# Patient Record
Sex: FEMALE | Race: BLACK OR AFRICAN AMERICAN | Employment: OTHER | URBAN - METROPOLITAN AREA
[De-identification: names, ages, dates, MRNs, and addresses within clinical notes are randomized per-mention and may not be internally consistent; named-entity substitution may affect disease eponyms.]

---

## 2019-01-09 ENCOUNTER — HOSPITAL ENCOUNTER (OUTPATIENT)
Dept: BONE DENSITY | Age: 79
Discharge: HOME | End: 2019-01-09
Payer: MEDICARE

## 2019-01-09 DIAGNOSIS — M81.0 OSTEOPOROSIS: ICD-10-CM

## 2019-01-09 PROCEDURE — 77080 DXA BONE DENSITY AXIAL: CPT

## 2021-07-21 ENCOUNTER — TRANSCRIBE ORDER (OUTPATIENT)
Dept: SCHEDULING | Age: 81
End: 2021-07-21

## 2021-07-21 DIAGNOSIS — M54.16 LUMBAR RADICULOPATHY: Primary | ICD-10-CM

## 2021-08-03 ENCOUNTER — HOSPITAL ENCOUNTER (OUTPATIENT)
Dept: GENERAL RADIOLOGY | Age: 81
Discharge: HOME | End: 2021-08-03
Attending: PAIN MEDICINE
Payer: MEDICARE

## 2021-08-03 ENCOUNTER — TRANSCRIBE ORDER (OUTPATIENT)
Dept: GENERAL RADIOLOGY | Age: 81
End: 2021-08-03

## 2021-08-03 ENCOUNTER — HOSPITAL ENCOUNTER (OUTPATIENT)
Dept: MRI IMAGING | Age: 81
Discharge: HOME | End: 2021-08-03
Attending: PAIN MEDICINE
Payer: MEDICARE

## 2021-08-03 DIAGNOSIS — M54.16 LUMBAR RADICULOPATHY: ICD-10-CM

## 2021-08-03 DIAGNOSIS — R52 PAIN: Primary | ICD-10-CM

## 2021-08-03 DIAGNOSIS — R52 PAIN: ICD-10-CM

## 2021-08-03 PROCEDURE — 72100 X-RAY EXAM L-S SPINE 2/3 VWS: CPT

## 2021-08-03 PROCEDURE — 73030 X-RAY EXAM OF SHOULDER: CPT

## 2021-08-03 PROCEDURE — 72148 MRI LUMBAR SPINE W/O DYE: CPT

## 2021-08-03 PROCEDURE — 73565 X-RAY EXAM OF KNEES: CPT

## 2021-08-03 PROCEDURE — 72050 X-RAY EXAM NECK SPINE 4/5VWS: CPT

## 2021-08-03 PROCEDURE — 73060 X-RAY EXAM OF HUMERUS: CPT

## 2021-08-31 ENCOUNTER — TRANSCRIBE ORDER (OUTPATIENT)
Dept: SCHEDULING | Age: 81
End: 2021-08-31

## 2021-08-31 DIAGNOSIS — Z12.31 VISIT FOR SCREENING MAMMOGRAM: Primary | ICD-10-CM

## 2021-09-15 ENCOUNTER — HOSPITAL ENCOUNTER (OUTPATIENT)
Dept: MAMMOGRAPHY | Age: 81
Discharge: HOME | End: 2021-09-15
Attending: FAMILY MEDICINE
Payer: MEDICARE

## 2021-09-15 DIAGNOSIS — Z12.31 VISIT FOR SCREENING MAMMOGRAM: ICD-10-CM

## 2021-09-15 PROCEDURE — 77067 SCR MAMMO BI INCL CAD: CPT

## 2022-04-19 ENCOUNTER — TELEPHONE (OUTPATIENT)
Dept: NEUROLOGY | Age: 82
End: 2022-04-19

## 2022-05-10 ENCOUNTER — HOSPITAL ENCOUNTER (EMERGENCY)
Age: 82
Discharge: HOME | End: 2022-05-10
Attending: EMERGENCY MEDICINE
Payer: COMMERCIAL

## 2022-05-10 VITALS
RESPIRATION RATE: 18 BRPM | OXYGEN SATURATION: 96 % | DIASTOLIC BLOOD PRESSURE: 94 MMHG | HEART RATE: 80 BPM | SYSTOLIC BLOOD PRESSURE: 167 MMHG | TEMPERATURE: 97.7 F

## 2022-05-10 DIAGNOSIS — N30.90 CYSTITIS: Primary | ICD-10-CM

## 2022-05-10 LAB
ALBUMIN SERPL-MCNC: 3.6 G/DL (ref 3.5–5)
ALBUMIN/GLOB SERPL: 0.8 {RATIO} (ref 1.1–2.2)
ALP SERPL-CCNC: 77 U/L (ref 45–117)
ALT SERPL-CCNC: 18 U/L (ref 12–78)
ANION GAP SERPL CALC-SCNC: 6 MMOL/L (ref 5–15)
APPEARANCE UR: ABNORMAL
AST SERPL-CCNC: 21 U/L (ref 15–37)
BACTERIA URNS QL MICRO: ABNORMAL /HPF
BASOPHILS # BLD: 0 K/UL (ref 0–0.1)
BASOPHILS NFR BLD: 0 % (ref 0–1)
BILIRUB SERPL-MCNC: 0.4 MG/DL (ref 0.2–1)
BILIRUB UR QL: NEGATIVE
BUN SERPL-MCNC: 27 MG/DL (ref 6–20)
BUN/CREAT SERPL: 27 (ref 12–20)
CALCIUM SERPL-MCNC: 9.8 MG/DL (ref 8.5–10.1)
CHLORIDE SERPL-SCNC: 108 MMOL/L (ref 97–108)
CO2 SERPL-SCNC: 24 MMOL/L (ref 21–32)
COLOR UR: ABNORMAL
COMMENT, HOLDF: NORMAL
CREAT SERPL-MCNC: 1.01 MG/DL (ref 0.55–1.02)
DIFFERENTIAL METHOD BLD: ABNORMAL
EOSINOPHIL # BLD: 0 K/UL (ref 0–0.4)
EOSINOPHIL NFR BLD: 0 % (ref 0–7)
EPITH CASTS URNS QL MICRO: ABNORMAL /LPF
ERYTHROCYTE [DISTWIDTH] IN BLOOD BY AUTOMATED COUNT: 13.1 % (ref 11.5–14.5)
GLOBULIN SER CALC-MCNC: 4.4 G/DL (ref 2–4)
GLUCOSE SERPL-MCNC: 177 MG/DL (ref 65–100)
GLUCOSE UR STRIP.AUTO-MCNC: NEGATIVE MG/DL
HCT VFR BLD AUTO: 36.6 % (ref 35–47)
HGB BLD-MCNC: 11.6 G/DL (ref 11.5–16)
HGB UR QL STRIP: ABNORMAL
HYALINE CASTS URNS QL MICRO: ABNORMAL /LPF (ref 0–5)
IMM GRANULOCYTES # BLD AUTO: 0 K/UL (ref 0–0.04)
IMM GRANULOCYTES NFR BLD AUTO: 0 % (ref 0–0.5)
KETONES UR QL STRIP.AUTO: NEGATIVE MG/DL
LEUKOCYTE ESTERASE UR QL STRIP.AUTO: ABNORMAL
LYMPHOCYTES # BLD: 1.1 K/UL (ref 0.8–3.5)
LYMPHOCYTES NFR BLD: 10 % (ref 12–49)
MCH RBC QN AUTO: 30.9 PG (ref 26–34)
MCHC RBC AUTO-ENTMCNC: 31.7 G/DL (ref 30–36.5)
MCV RBC AUTO: 97.6 FL (ref 80–99)
MONOCYTES # BLD: 0.7 K/UL (ref 0–1)
MONOCYTES NFR BLD: 6 % (ref 5–13)
NEUTS SEG # BLD: 9.2 K/UL (ref 1.8–8)
NEUTS SEG NFR BLD: 84 % (ref 32–75)
NITRITE UR QL STRIP.AUTO: NEGATIVE
NRBC # BLD: 0 K/UL (ref 0–0.01)
NRBC BLD-RTO: 0 PER 100 WBC
PH UR STRIP: 5.5 [PH] (ref 5–8)
PLATELET # BLD AUTO: 296 K/UL (ref 150–400)
PMV BLD AUTO: 9.4 FL (ref 8.9–12.9)
POTASSIUM SERPL-SCNC: 3.9 MMOL/L (ref 3.5–5.1)
PROT SERPL-MCNC: 8 G/DL (ref 6.4–8.2)
PROT UR STRIP-MCNC: 100 MG/DL
RBC # BLD AUTO: 3.75 M/UL (ref 3.8–5.2)
RBC #/AREA URNS HPF: ABNORMAL /HPF (ref 0–5)
SAMPLES BEING HELD,HOLD: NORMAL
SODIUM SERPL-SCNC: 138 MMOL/L (ref 136–145)
SP GR UR REFRACTOMETRY: 1.02 (ref 1–1.03)
UR CULT HOLD, URHOLD: NORMAL
UROBILINOGEN UR QL STRIP.AUTO: 0.2 EU/DL (ref 0.2–1)
WBC # BLD AUTO: 11 K/UL (ref 3.6–11)
WBC URNS QL MICRO: >100 /HPF (ref 0–4)

## 2022-05-10 PROCEDURE — 87077 CULTURE AEROBIC IDENTIFY: CPT

## 2022-05-10 PROCEDURE — 87086 URINE CULTURE/COLONY COUNT: CPT

## 2022-05-10 PROCEDURE — 80053 COMPREHEN METABOLIC PANEL: CPT

## 2022-05-10 PROCEDURE — 87186 SC STD MICRODIL/AGAR DIL: CPT

## 2022-05-10 PROCEDURE — 85025 COMPLETE CBC W/AUTO DIFF WBC: CPT

## 2022-05-10 PROCEDURE — 99283 EMERGENCY DEPT VISIT LOW MDM: CPT

## 2022-05-10 PROCEDURE — 36415 COLL VENOUS BLD VENIPUNCTURE: CPT

## 2022-05-10 PROCEDURE — 81001 URINALYSIS AUTO W/SCOPE: CPT

## 2022-05-10 RX ORDER — CEPHALEXIN 500 MG/1
500 CAPSULE ORAL 2 TIMES DAILY
Qty: 14 CAPSULE | Refills: 0 | Status: SHIPPED | OUTPATIENT
Start: 2022-05-10 | End: 2022-05-17

## 2022-05-10 NOTE — ED PROVIDER NOTES
Patient is an 27-year-old female with past medical history of type 2 diabetes, hypertension, hyperlipidemia, GERD who presents with no active complaints. She reports that this morning, she had some lower extremity cramping and was having difficulty walking due to the cramping. Her family called EMS. The cramping has since resolved. Her daughter initially accompanied her into the hospital and reported that she was concerned that she may have a urinary tract infection and is concerned that she is having diarrhea. The patient denies any urinary symptoms. The patient denies any diarrhea. She reports that she has no complaints and has no pain anywhere. She notes that when EMS came, they told her that her blood pressure and blood sugar was high, but is unable to tell me an actual number. She has not taken her medication today. No past medical history on file. Past Surgical History:   Procedure Laterality Date    HX BREAST BIOPSY Right          Family History:   Problem Relation Age of Onset    Breast Cancer Sister        Social History     Socioeconomic History    Marital status:      Spouse name: Not on file    Number of children: Not on file    Years of education: Not on file    Highest education level: Not on file   Occupational History    Not on file   Tobacco Use    Smoking status: Not on file    Smokeless tobacco: Not on file   Substance and Sexual Activity    Alcohol use: Not on file    Drug use: Not on file    Sexual activity: Not on file   Other Topics Concern    Not on file   Social History Narrative    Not on file     Social Determinants of Health     Financial Resource Strain:     Difficulty of Paying Living Expenses: Not on file   Food Insecurity:     Worried About Running Out of Food in the Last Year: Not on file    Ron of Food in the Last Year: Not on file   Transportation Needs:     Lack of Transportation (Medical):  Not on file    Lack of Transportation (Non-Medical): Not on file   Physical Activity:     Days of Exercise per Week: Not on file    Minutes of Exercise per Session: Not on file   Stress:     Feeling of Stress : Not on file   Social Connections:     Frequency of Communication with Friends and Family: Not on file    Frequency of Social Gatherings with Friends and Family: Not on file    Attends Jainism Services: Not on file    Active Member of [address] [place] [address] or Organizations: Not on file    Attends Club or Organization Meetings: Not on file    Marital Status: Not on file   Intimate Partner Violence:     Fear of Current or Ex-Partner: Not on file    Emotionally Abused: Not on file    Physically Abused: Not on file    Sexually Abused: Not on file   Housing Stability:     Unable to Pay for Housing in the Last Year: Not on file    Number of [place] in the Last Year: Not on file    Unstable Housing in the Last Year: Not on file         ALLERGIES: Patient has no allergy information on record. Review of Systems   Constitutional: Negative for unexpected weight change. HENT: Negative for congestion. Eyes: Negative for visual disturbance. Respiratory: Negative for cough, chest tightness and shortness of breath. Cardiovascular: Negative for chest pain. Gastrointestinal: Negative for abdominal pain, diarrhea, nausea and vomiting. Endocrine: Negative for polyuria. Genitourinary: Negative for dysuria, flank pain, frequency and hematuria. Musculoskeletal: Negative for back pain. Skin: Negative for color change. Allergic/Immunologic: Negative for immunocompromised state. Neurological: Negative for dizziness and headaches. Hematological: Negative for adenopathy. Psychiatric/Behavioral: Negative for agitation. Vitals:    05/10/22 1218   BP: (!) 190/73   Pulse: 82   Resp: 18   Temp: 98 °F (36.7 °C)   SpO2: 97%            Physical Exam  Vitals and nursing note reviewed.    Constitutional:       Appearance: Normal appearance. She is obese. HENT:      Head: Atraumatic. Eyes:      Conjunctiva/sclera: Conjunctivae normal.      Pupils: Pupils are equal, round, and reactive to light. Cardiovascular:      Rate and Rhythm: Normal rate and regular rhythm. Pulses: Normal pulses. Heart sounds: Normal heart sounds. Pulmonary:      Effort: Pulmonary effort is normal.      Breath sounds: Normal breath sounds. Abdominal:      General: Abdomen is flat. Bowel sounds are normal. There is no distension. Tenderness: There is no abdominal tenderness. There is no right CVA tenderness, left CVA tenderness, guarding or rebound. Musculoskeletal:         General: Normal range of motion. Cervical back: Neck supple. Skin:     General: Skin is warm and dry. Capillary Refill: Capillary refill takes less than 2 seconds. Neurological:      General: No focal deficit present. Mental Status: She is alert and oriented to person, place, and time. Mental status is at baseline. Psychiatric:         Mood and Affect: Mood normal.         Behavior: Behavior normal.          MDM  Number of Diagnoses or Management Options  Cystitis  Diagnosis management comments: 2005 -labs unremarkable. UA with evidence of acute cystitis. We will send off urine for culture. Patient has no abdominal pain, has no flank tenderness, and is not altered. We will treat patient with oral Keflex. Discussed specific return precautions. Advised to follow-up with primary care in the next 5 days for reevaluation. Discussed my clinical impression(s), any labs and/or radiology results with the patient. I answered any questions and addressed any concerns. Discussed the importance of following up with their primary care physician and/or specialist(s). Discussed signs or symptoms that would warrant return back to the ER for further evaluation. The patient is agreeable with discharge.        Amount and/or Complexity of Data Reviewed  Clinical lab tests: ordered and reviewed    Patient Progress  Patient progress: stable         Procedures

## 2022-05-10 NOTE — ED TRIAGE NOTES
Patient from home where she lives with daughter. Patient has no complaints but daughter reports she has possible UTI and diarrhea.

## 2022-05-11 ENCOUNTER — TELEPHONE (OUTPATIENT)
Dept: NEUROLOGY | Age: 82
End: 2022-05-11

## 2022-05-11 NOTE — DISCHARGE INSTRUCTIONS
Thank you for allowing us to provide you with medical care today. We realize that you have many choices for your emergency care needs. We thank you for choosing [facility]. Please choose us in the future for any continued health care needs. The exam and treatment you received in the emergency department were for an emergent problem and are not intended as complete care. It is important that you follow-up with a doctor. If your symptoms worsen or you do not improve should return to the emergency department. We are available 24 hours a day. Please make an appointment with your health care provider for follow-up of your emergency department visit. Take this sheet with you when you go to your follow-up visit.

## 2022-05-11 NOTE — ED NOTES
.Patient received discharge instructions by NP and RN. Reviewed discharge instructions with patient. Patient verbalized understanding of discharge teaching. Patient left ED via wheelchair.

## 2022-05-13 LAB
BACTERIA SPEC CULT: ABNORMAL
CC UR VC: ABNORMAL
SERVICE CMNT-IMP: ABNORMAL

## 2022-06-03 ENCOUNTER — OFFICE VISIT (OUTPATIENT)
Dept: NEUROLOGY | Age: 82
End: 2022-06-03
Payer: MEDICARE

## 2022-06-03 DIAGNOSIS — G56.23 ULNAR NEUROPATHY OF BOTH UPPER EXTREMITIES: ICD-10-CM

## 2022-06-03 DIAGNOSIS — G56.03 BILATERAL CARPAL TUNNEL SYNDROME: Primary | ICD-10-CM

## 2022-06-03 PROCEDURE — 95911 NRV CNDJ TEST 9-10 STUDIES: CPT | Performed by: PSYCHIATRY & NEUROLOGY

## 2022-06-03 PROCEDURE — 95886 MUSC TEST DONE W/N TEST COMP: CPT | Performed by: PSYCHIATRY & NEUROLOGY

## 2022-06-03 NOTE — PROGRESS NOTES
[address] [facility] Neurology [facility] ([place] [place]  [address] [place] [address]  Phone [phone] Fax [phone]  Test Date:  6/3/2022    Patient: Bibi Barnard : 1940 Physician: Maya Kaye DO   Sex: Female Height: ' \" Ref Phys: Justin Powell MD   ID#: [id number]  Weight:  lbs. Technician: Claudette Tripathi     Patient Complaints:  Bilateral hand pain/numbness    NCV & EMG Findings:  Evaluation of the left median motor and the right median motor nerves showed decreased conduction velocity (Elbow-Wrist, L47, R35 m/s). The left ulnar motor and the right ulnar motor nerves showed decreased conduction velocity (A Elbow-B Elbow, L26, R27 m/s). The left median sensory nerve showed prolonged distal peak latency (5.3 ms) and decreased conduction velocity (Wrist-2nd Digit, 26 m/s). The right median sensory nerve showed prolonged distal peak latency (5.5 ms), reduced amplitude (5.5 µV), and decreased conduction velocity (Wrist-2nd Digit, 25 m/s). The left ulnar sensory nerve showed prolonged distal peak latency (5.4 ms) and decreased conduction velocity (Wrist-5th Digit, 26 m/s). All remaining nerves  were within normal limits. All examined muscles (as indicated in the following table) showed no evidence of electrical instability. Impression:  Extensive electrodiagnostic examination of the right upper extremity and additional nerve conduction studies of the left upper extremity reveals changes most consistent with the followin. Bilateral median neuropathies at or distal to the wrists, consistent with a clinical diagnosis of carpal tunnel syndrome, moderate in degree electrically. 2. No evidence of a right cervical motor radiculopathy. 3. 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 ___________________________  Loren Kaye DO        Nerve Conduction Studies  Anti Sensory Summary Table     Stim Site NR Peak (ms) Norm Peak (ms) P-T Amp (µV) Norm P-T Amp Onset (ms) Site1 Site2 Delta-P (ms) Dist (cm) Sergo (m/s) Norm Sergo (m/s)   Left Median Anti Sensory (2nd Digit)  32°C   Wrist    5.3 <3.6 10.5 >10 4.6 Wrist 2nd Digit 5.3 14.0 26 >39   Right Median Anti Sensory (2nd Digit)  32.4°C   Wrist    5.5 <3.6 5.5 >10 4.5 Wrist 2nd Digit 5.5 14.0 25 >39   Left Radial Anti Sensory (Base 1st Digit)  32.2°C   Wrist    2.1 <3.1 33.6  1.6 Wrist Base 1st Digit 2.1 0.0     Right Radial Anti Sensory (Base 1st Digit)  32.4°C   Wrist    1.5 <3.1 36.5  1.0 Wrist Base 1st Digit 1.5 0.0     Left Ulnar Anti Sensory (5th Digit)  32.1°C   Wrist    5.4 <3.7 28.5 >15.0 4.1 Wrist 5th Digit 5.4 14.0 26 >38   Right Ulnar Anti Sensory (5th Digit)  32.4°C   Wrist    2.7 <3.7 15.2 >15.0 2.2 Wrist 5th Digit 2.7 14.0 52 >38     Motor Summary Table     Stim Site NR Onset (ms) Norm Onset (ms) O-P Amp (mV) Norm O-P Amp Site1 Site2 Delta-0 (ms) Dist (cm) Sergo (m/s) Norm Sergo (m/s)   Left Median Motor (Abd Poll Brev)  32.3°C   Wrist    4.1 <4.2 5.0 >5 Elbow Wrist 4.7 22.0 47 >50   Elbow    8.8  4.8          Right Median Motor (Abd Poll Brev)  32.3°C   Wrist    3.4 <4.2 5.4 >5 Elbow Wrist 6.2 22.0 35 >50   Elbow    9.6  3.8          Left Ulnar Motor (Abd Dig Minimi)  33.2°C   Wrist    3.3 <4.2 4.6 >3 B Elbow Wrist 3.0 16.0 53 >53   B Elbow    6.3  3.6  A Elbow B Elbow 3.8 10.0 26 >53   A Elbow    10.1  1.5          Right Ulnar Motor (Abd Dig Minimi)  32.5°C   Wrist    3.0 <4.2 7.1 >3 B Elbow Wrist 2.7 16.0 59 >53   B Elbow    5.7  5.5  A Elbow B Elbow 3.7 10.0 27 >53   A Elbow    9.4  5.4            EMG     Side Muscle Nerve Root Ins Act Fibs Psw Amp Dur Poly Recrt Int Amie Racer Comment   Right 1stDorInt Ulnar C8-T1 Nml Nml Nml Nml Nml 0 Nml Nml    Right Abd Poll Brev Median C8-T1 Nml Nml Nml Nml Nml 0 Nml Nml    Right FlexPolLong Median (Ant Int) C7-8 Nml Nml Nml Nml Nml 0 Nml Nml    Right Ext Indicis Radial (Post Int) C7-8 Nml Nml Nml Nml Nml 0 Nml Nml    Right Biceps Musculocut C5-6 Nml Nml Nml Nml Nml 0 Nml Nml    Right Triceps Radial C6-7-8 Nml Nml Nml Nml Nml 0 Nml Nml    Right Deltoid Axillary C5-6 Nml Nml Nml Nml Nml 0 Nml Nml          Waveforms:

## 2022-09-21 ENCOUNTER — APPOINTMENT (OUTPATIENT)
Dept: CT IMAGING | Age: 82
End: 2022-09-21
Attending: STUDENT IN AN ORGANIZED HEALTH CARE EDUCATION/TRAINING PROGRAM
Payer: MEDICARE

## 2022-09-21 ENCOUNTER — HOSPITAL ENCOUNTER (EMERGENCY)
Age: 82
Discharge: HOME | End: 2022-09-21
Attending: STUDENT IN AN ORGANIZED HEALTH CARE EDUCATION/TRAINING PROGRAM
Payer: MEDICARE

## 2022-09-21 VITALS
WEIGHT: 140 LBS | HEART RATE: 88 BPM | SYSTOLIC BLOOD PRESSURE: 158 MMHG | TEMPERATURE: 98.4 F | RESPIRATION RATE: 18 BRPM | OXYGEN SATURATION: 96 % | DIASTOLIC BLOOD PRESSURE: 72 MMHG | HEIGHT: 59 IN | BODY MASS INDEX: 28.22 KG/M2

## 2022-09-21 DIAGNOSIS — R19.7 DIARRHEA, UNSPECIFIED TYPE: Primary | ICD-10-CM

## 2022-09-21 LAB
ALBUMIN SERPL-MCNC: 3.3 G/DL (ref 3.5–5)
ALBUMIN/GLOB SERPL: 0.7 {RATIO} (ref 1.1–2.2)
ALP SERPL-CCNC: 81 U/L (ref 45–117)
ALT SERPL-CCNC: 24 U/L (ref 12–78)
ANION GAP SERPL CALC-SCNC: 8 MMOL/L (ref 5–15)
APPEARANCE UR: CLEAR
AST SERPL-CCNC: 20 U/L (ref 15–37)
BACTERIA URNS QL MICRO: NEGATIVE /HPF
BASOPHILS # BLD: 0 K/UL (ref 0–0.1)
BASOPHILS NFR BLD: 0 % (ref 0–1)
BILIRUB SERPL-MCNC: 0.4 MG/DL (ref 0.2–1)
BILIRUB UR QL: NEGATIVE
BUN SERPL-MCNC: 44 MG/DL (ref 6–20)
BUN/CREAT SERPL: 38 (ref 12–20)
CALCIUM SERPL-MCNC: 9.4 MG/DL (ref 8.5–10.1)
CHLORIDE SERPL-SCNC: 112 MMOL/L (ref 97–108)
CO2 SERPL-SCNC: 20 MMOL/L (ref 21–32)
COLOR UR: ABNORMAL
CREAT SERPL-MCNC: 1.17 MG/DL (ref 0.55–1.02)
DIFFERENTIAL METHOD BLD: ABNORMAL
EOSINOPHIL # BLD: 0.1 K/UL (ref 0–0.4)
EOSINOPHIL NFR BLD: 1 % (ref 0–7)
EPITH CASTS URNS QL MICRO: ABNORMAL /LPF
ERYTHROCYTE [DISTWIDTH] IN BLOOD BY AUTOMATED COUNT: 13.6 % (ref 11.5–14.5)
GLOBULIN SER CALC-MCNC: 4.8 G/DL (ref 2–4)
GLUCOSE SERPL-MCNC: 190 MG/DL (ref 65–100)
GLUCOSE UR STRIP.AUTO-MCNC: NEGATIVE MG/DL
HCT VFR BLD AUTO: 33.5 % (ref 35–47)
HGB BLD-MCNC: 10.7 G/DL (ref 11.5–16)
HGB UR QL STRIP: NEGATIVE
IMM GRANULOCYTES # BLD AUTO: 0.1 K/UL (ref 0–0.04)
IMM GRANULOCYTES NFR BLD AUTO: 0 % (ref 0–0.5)
KETONES UR QL STRIP.AUTO: NEGATIVE MG/DL
LEUKOCYTE ESTERASE UR QL STRIP.AUTO: ABNORMAL
LYMPHOCYTES # BLD: 1.9 K/UL (ref 0.8–3.5)
LYMPHOCYTES NFR BLD: 16 % (ref 12–49)
MAGNESIUM SERPL-MCNC: 2 MG/DL (ref 1.6–2.4)
MCH RBC QN AUTO: 30.6 PG (ref 26–34)
MCHC RBC AUTO-ENTMCNC: 31.9 G/DL (ref 30–36.5)
MCV RBC AUTO: 95.7 FL (ref 80–99)
MONOCYTES # BLD: 1 K/UL (ref 0–1)
MONOCYTES NFR BLD: 8 % (ref 5–13)
NEUTS SEG # BLD: 8.7 K/UL (ref 1.8–8)
NEUTS SEG NFR BLD: 75 % (ref 32–75)
NITRITE UR QL STRIP.AUTO: NEGATIVE
NRBC # BLD: 0 K/UL (ref 0–0.01)
NRBC BLD-RTO: 0 PER 100 WBC
PH UR STRIP: 5.5 [PH] (ref 5–8)
PLATELET # BLD AUTO: 335 K/UL (ref 150–400)
PMV BLD AUTO: 9.2 FL (ref 8.9–12.9)
POTASSIUM SERPL-SCNC: 4.1 MMOL/L (ref 3.5–5.1)
PROT SERPL-MCNC: 8.1 G/DL (ref 6.4–8.2)
PROT UR STRIP-MCNC: 30 MG/DL
RBC # BLD AUTO: 3.5 M/UL (ref 3.8–5.2)
RBC #/AREA URNS HPF: ABNORMAL /HPF (ref 0–5)
SODIUM SERPL-SCNC: 140 MMOL/L (ref 136–145)
SP GR UR REFRACTOMETRY: 1.02
UA: UC IF INDICATED,UAUC: ABNORMAL
UROBILINOGEN UR QL STRIP.AUTO: 0.2 EU/DL (ref 0.2–1)
WBC # BLD AUTO: 11.9 K/UL (ref 3.6–11)
WBC URNS QL MICRO: ABNORMAL /HPF (ref 0–4)
YEAST URNS QL MICRO: PRESENT

## 2022-09-21 PROCEDURE — 85025 COMPLETE CBC W/AUTO DIFF WBC: CPT

## 2022-09-21 PROCEDURE — 80053 COMPREHEN METABOLIC PANEL: CPT

## 2022-09-21 PROCEDURE — 81001 URINALYSIS AUTO W/SCOPE: CPT

## 2022-09-21 PROCEDURE — 99284 EMERGENCY DEPT VISIT MOD MDM: CPT

## 2022-09-21 PROCEDURE — 70450 CT HEAD/BRAIN W/O DYE: CPT

## 2022-09-21 PROCEDURE — 36415 COLL VENOUS BLD VENIPUNCTURE: CPT

## 2022-09-21 PROCEDURE — 83735 ASSAY OF MAGNESIUM: CPT

## 2022-09-21 NOTE — ED PROVIDER NOTES
EMERGENCY DEPARTMENT HISTORY AND PHYSICAL EXAM      Date: 9/21/2022  Patient Name: Madison Cooley    History of Presenting Illness     Chief Complaint   Patient presents with    Neck Pain     Pt into triage via wheelchair w/ daughter d/t bilateral neck pain x Saturday. Daughter also reporting pt has had significant diarrhea x 2 days and has been altered/\"delayed\" and not behaving at her baseline      Diarrhea    Altered mental status       History Provided By: Patient    HPI: Madison Cooley, 80 y.o. female with a past medical history significant for hypertension, diabetes, dementia presents to the ED with cc of altered mental status, diarrhea, neck pain. Patient notes she has had bilateral ear pain or radiating down the side of her body for a long time. She notes this is her baseline and she takes methocarbamol for it. Over the past few days her daughter notes she has been becoming more \"delayed\". She has been doing think she took some normally do around the house including laying on the couch and walking around in her adult diapers with no pants on. Patient notes this is just because she has arthritis and needs to rest on the couch once in a while. She is been eating and drinking normally and denies any shortness of breath, chest pain, vision changes, nausea, vomiting, abdominal pain, dysuria. Daughter notes she had 2 episodes of diarrhea recently but there is no blood in it. No recent changes to medications. There are no other complaints, changes, or physical findings at this time. PCP: Dow Frankel, MD    No current facility-administered medications on file prior to encounter. No current outpatient medications on file prior to encounter. Past History     Past Medical History:  No past medical history on file.     Past Surgical History:  Past Surgical History:   Procedure Laterality Date    HX BREAST BIOPSY Right        Family History:  Family History   Problem Relation Age of Onset    Breast Cancer Sister        Social History: Allergies: Allergies   Allergen Reactions    Latex Swelling    Ace Inhibitors Angioedema    Iodine Angioedema    Tramadol Nausea and Vomiting         Review of Systems   Review of Systems   Constitutional:  Negative for appetite change and fever. HENT:  Negative for congestion. Respiratory:  Negative for cough. Cardiovascular:  Negative for chest pain. Gastrointestinal:  Positive for diarrhea. Negative for abdominal distention, constipation, nausea and vomiting. Genitourinary:  Negative for dysuria. Musculoskeletal:  Negative for arthralgias and back pain. Skin:  Negative for rash. Neurological:  Positive for headaches. Negative for dizziness and numbness. Psychiatric/Behavioral:  Positive for confusion. Physical Exam   Physical Exam  Vitals and nursing note reviewed. Constitutional:       Appearance: Normal appearance. She is well-developed. HENT:      Head: Normocephalic and atraumatic. Nose: Nose normal.      Mouth/Throat:      Mouth: Mucous membranes are moist.      Pharynx: Oropharynx is clear. Eyes:      Extraocular Movements: Extraocular movements intact. Right eye: No nystagmus. Left eye: No nystagmus. Pupils: Pupils are equal, round, and reactive to light. Neck:      Comments: Pain all around the neck, no pain with movment, normal felxion  Cardiovascular:      Rate and Rhythm: Normal rate and regular rhythm. Heart sounds: Normal heart sounds. Pulmonary:      Effort: Pulmonary effort is normal.      Breath sounds: Normal breath sounds. Abdominal:      General: Abdomen is flat. There is no distension. Palpations: Abdomen is soft. Tenderness: There is no abdominal tenderness. Musculoskeletal:         General: No swelling, deformity or signs of injury. Normal range of motion. Cervical back: Normal range of motion. Skin:     General: Skin is warm and dry.    Neurological:      General: No focal deficit present. Mental Status: She is alert and oriented to person, place, and time. GCS: GCS eye subscore is 4. GCS verbal subscore is 5. GCS motor subscore is 6. Cranial Nerves: No cranial nerve deficit, dysarthria or facial asymmetry. Sensory: No sensory deficit. Psychiatric:         Mood and Affect: Mood normal.         Behavior: Behavior normal.       Diagnostic Study Results     Labs -     Recent Results (from the past 24 hour(s))   CBC WITH AUTOMATED DIFF    Collection Time: 09/21/22  4:45 PM   Result Value Ref Range    WBC 11.9 (H) 3.6 - 11.0 K/uL    RBC 3.50 (L) 3.80 - 5.20 M/uL    HGB 10.7 (L) 11.5 - 16.0 g/dL    HCT 33.5 (L) 35.0 - 47.0 %    MCV 95.7 80.0 - 99.0 FL    MCH 30.6 26.0 - 34.0 PG    MCHC 31.9 30.0 - 36.5 g/dL    RDW 13.6 11.5 - 14.5 %    PLATELET 744 609 - 629 K/uL    MPV 9.2 8.9 - 12.9 FL    NRBC 0.0 0  WBC    ABSOLUTE NRBC 0.00 0.00 - 0.01 K/uL    NEUTROPHILS 75 32 - 75 %    LYMPHOCYTES 16 12 - 49 %    MONOCYTES 8 5 - 13 %    EOSINOPHILS 1 0 - 7 %    BASOPHILS 0 0 - 1 %    IMMATURE GRANULOCYTES 0 0.0 - 0.5 %    ABS. NEUTROPHILS 8.7 (H) 1.8 - 8.0 K/UL    ABS. LYMPHOCYTES 1.9 0.8 - 3.5 K/UL    ABS. MONOCYTES 1.0 0.0 - 1.0 K/UL    ABS. EOSINOPHILS 0.1 0.0 - 0.4 K/UL    ABS. BASOPHILS 0.0 0.0 - 0.1 K/UL    ABS. IMM.  GRANS. 0.1 (H) 0.00 - 0.04 K/UL    DF AUTOMATED     METABOLIC PANEL, COMPREHENSIVE    Collection Time: 09/21/22  4:45 PM   Result Value Ref Range    Sodium 140 136 - 145 mmol/L    Potassium 4.1 3.5 - 5.1 mmol/L    Chloride 112 (H) 97 - 108 mmol/L    CO2 20 (L) 21 - 32 mmol/L    Anion gap 8 5 - 15 mmol/L    Glucose 190 (H) 65 - 100 mg/dL    BUN 44 (H) 6 - 20 MG/DL    Creatinine 1.17 (H) 0.55 - 1.02 MG/DL    BUN/Creatinine ratio 38 (H) 12 - 20      GFR est AA 54 (L) >60 ml/min/1.73m2    GFR est non-AA 44 (L) >60 ml/min/1.73m2    Calcium 9.4 8.5 - 10.1 MG/DL    Bilirubin, total 0.4 0.2 - 1.0 MG/DL    ALT (SGPT) 24 12 - 78 U/L    AST (SGOT) 20 15 - 37 U/L    Alk. phosphatase 81 45 - 117 U/L    Protein, total 8.1 6.4 - 8.2 g/dL    Albumin 3.3 (L) 3.5 - 5.0 g/dL    Globulin 4.8 (H) 2.0 - 4.0 g/dL    A-G Ratio 0.7 (L) 1.1 - 2.2     MAGNESIUM    Collection Time: 09/21/22  4:45 PM   Result Value Ref Range    Magnesium 2.0 1.6 - 2.4 mg/dL   URINALYSIS W/ REFLEX CULTURE    Collection Time: 09/21/22  7:13 PM    Specimen: Urine   Result Value Ref Range    Color YELLOW/STRAW      Appearance CLEAR CLEAR      Specific gravity 1.025      pH (UA) 5.5 5.0 - 8.0      Protein 30 (A) NEG mg/dL    Glucose Negative NEG mg/dL    Ketone Negative NEG mg/dL    Bilirubin Negative NEG      Blood Negative NEG      Urobilinogen 0.2 0.2 - 1.0 EU/dL    Nitrites Negative NEG      Leukocyte Esterase SMALL (A) NEG      WBC 5-10 0 - 4 /hpf    RBC 0-5 0 - 5 /hpf    Epithelial cells MODERATE (A) FEW /lpf    Bacteria Negative NEG /hpf    UA:UC IF INDICATED CULTURE NOT INDICATED BY UA RESULT CNI      Yeast PRESENT (A) NEG         Radiologic Studies -   CT HEAD WO CONT   Final Result   1. No evidence of acute intracranial abnormality. 2. Chronic changes include microns particulate matter disease and diffuse   parenchymal volume loss. CT Results  (Last 48 hours)                 09/21/22 1846  CT HEAD WO CONT Final result    Impression:  1. No evidence of acute intracranial abnormality. 2. Chronic changes include microns particulate matter disease and diffuse   parenchymal volume loss. Narrative:  EXAM:  CT HEAD WO CONT       INDICATION:   AMS       COMPARISON: None. TECHNIQUE: Unenhanced CT of the head was performed using 5 mm images. Brain and   bone windows were generated. CT dose reduction was achieved through use of a   standardized protocol tailored for this examination and automatic exposure   control for dose modulation. FINDINGS:   Diffuse parenchymal volume loss with exvacuodilatation of the ventricles and   extraventricular CSF spaces.  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 Basilar cisterns are patent. No midline shift. There is no evidence of   acute infarct, hemorrhage, or extraaxial fluid collection. The paranasal sinuses, mastoid air cells, and middle ears are clear. Status post bilateral lense replacement. The orbits are otherwise unremarkable. There are no significant osseous or extracranial soft tissue lesions. CXR Results  (Last 48 hours)      None              Medical Decision Making   I am the first provider for this patient. I reviewed the vital signs, available nursing notes, past medical history, past surgical history, family history and social history. Vital Signs-Reviewed the patient's vital signs. Patient Vitals for the past 12 hrs:   Temp Pulse Resp BP SpO2   09/21/22 1637 98.4 °F (36.9 °C) 88 18 (!) 158/72 96 %       Records Reviewed: Nursing records and medical records reviewed    MDM:  DDx includes electrolyte abnormalities, dementia, delirium, intracranial hemorrhage, stroke, UTI    Provider Notes (Medical Decision Making):   19-year-old female with history of diabetes, hypertension presents with altered mental status, ear pain, diarrhea. Her vital signs are stable upon arrival.  She appears well and she is alert and oriented x4 and does not appear confused though daughter notes she is just a little off. She has a normal neurological exam with normal cranial nerves and no focal neurological deficits. The pain she describes in the bilateral ears rating down her entire body is a bit unusual and she is normal ears. She complains of neck pain but it is diffuse and musculoskeletal and not consistent with a meningitis type picture. She does have chronic pain on top of this.   Her lab work shows a mild white blood cell count elevation but her glucose is within normal limits and her creatinine is 1.17 which is not far off from her baseline. We will urinalysis and head CT in addition to this lab work to evaluate for urinary tract infection or intracranial pathology. ED Course:   Initial assessment performed. The patients presenting problems have been discussed, and they are in agreement with the care plan formulated and outlined with them. I have encouraged them to ask questions as they arise throughout their visit. ED Course as of 09/21/22 1955   Wed Sep 21, 2022   1953 Labs notable for small increase in your creatinine however not considered an MAGDA. Her BUN is mildly elevated as well. This might be because of some of her mental status change but not requiring hospitalization at this time as she is ANO x4 and doing well. Discussed increased water intake which they agreed to. Discussed admission but risk of delirium worsening mental status is also a risk. Discussed treating this at home which everybody agreed with. Discussed return precautions if she starts getting fever inability tolerate p.o. worsening diarrhea or mental status [JS]   1954 Noted her white blood cell count is mildly elevated 11.9 however her baseline is 11 do not suspect there to be an infectious etiology here. Her urine is negative for acute infection. [JS]      ED Course User Index  [JS] Re Duarte MD           Disposition:  Discharge Note:  7:55 PM  The patient has been re-evaluated and is ready for discharge. Reviewed available results with patient. Counseled patient on diagnosis and care plan. Patient has expressed understanding, and all questions have been answered. Patient agrees with plan and agrees to follow up as recommended, or to return to the ED if their symptoms worsen. Discharge instructions have been provided and explained to the patient, along with reasons to return to the ED. DISCHARGE PLAN:  1. There are no discharge medications for this patient.     2.   Follow-up Information       Follow up With Specialties Details Why Contact Info    [facility] EMERGENCY DEPT Emergency Medicine  If symptoms worsen [address]  [address] [facility]  [phone]    Kennedy Romero MD Family Medicine In 1 week  [address]  [place] [address]  [phone]            3. Return to ED if worse     Diagnosis     Clinical Impression:   1. Diarrhea, unspecified type        Attestations:    Rei Frost MD    Please note that this dictation was completed with [employer]er, the computer voice recognition software. Quite often unanticipated grammatical, syntax, homophones, and other interpretive errors are inadvertently transcribed by the computer software. Please disregard these errors. Please excuse any errors that have escaped final proofreading. Thank you.

## 2023-01-18 ENCOUNTER — APPOINTMENT (OUTPATIENT)
Dept: CT IMAGING | Age: 83
End: 2023-01-18
Attending: STUDENT IN AN ORGANIZED HEALTH CARE EDUCATION/TRAINING PROGRAM
Payer: MEDICARE

## 2023-01-18 ENCOUNTER — APPOINTMENT (OUTPATIENT)
Dept: GENERAL RADIOLOGY | Age: 83
End: 2023-01-18
Attending: STUDENT IN AN ORGANIZED HEALTH CARE EDUCATION/TRAINING PROGRAM
Payer: MEDICARE

## 2023-01-18 ENCOUNTER — APPOINTMENT (OUTPATIENT)
Dept: ULTRASOUND IMAGING | Age: 83
End: 2023-01-18
Attending: STUDENT IN AN ORGANIZED HEALTH CARE EDUCATION/TRAINING PROGRAM
Payer: MEDICARE

## 2023-01-18 ENCOUNTER — HOSPITAL ENCOUNTER (INPATIENT)
Age: 83
LOS: 7 days | Discharge: NURSING, REHAB OR LONG-TERM CARE | End: 2023-01-26
Attending: STUDENT IN AN ORGANIZED HEALTH CARE EDUCATION/TRAINING PROGRAM | Admitting: STUDENT IN AN ORGANIZED HEALTH CARE EDUCATION/TRAINING PROGRAM
Payer: MEDICARE

## 2023-01-18 DIAGNOSIS — M25.562 ACUTE PAIN OF LEFT KNEE: ICD-10-CM
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
## 2023-01-18 LAB
ALBUMIN SERPL-MCNC: 3 G/DL (ref 3.5–5)
ALBUMIN/GLOB SERPL: 0.6 (ref 1.1–2.2)
ALP SERPL-CCNC: 92 U/L (ref 45–117)
ALT SERPL-CCNC: 20 U/L (ref 12–78)
ANION GAP SERPL CALC-SCNC: 9 MMOL/L (ref 5–15)
APPEARANCE UR: ABNORMAL
AST SERPL-CCNC: 41 U/L (ref 15–37)
BACTERIA URNS QL MICRO: ABNORMAL /HPF
BASOPHILS # BLD: 0 K/UL (ref 0–0.1)
BASOPHILS NFR BLD: 0 % (ref 0–1)
BILIRUB SERPL-MCNC: 0.6 MG/DL (ref 0.2–1)
BILIRUB UR QL: NEGATIVE
BNP SERPL-MCNC: 741 PG/ML
BUN SERPL-MCNC: 36 MG/DL (ref 6–20)
BUN/CREAT SERPL: 27 (ref 12–20)
CALCIUM SERPL-MCNC: 9.9 MG/DL (ref 8.5–10.1)
CHLORIDE SERPL-SCNC: 110 MMOL/L (ref 97–108)
CO2 SERPL-SCNC: 22 MMOL/L (ref 21–32)
COLOR UR: ABNORMAL
CREAT SERPL-MCNC: 1.31 MG/DL (ref 0.55–1.02)
DIFFERENTIAL METHOD BLD: ABNORMAL
EOSINOPHIL # BLD: 0 K/UL (ref 0–0.4)
EOSINOPHIL NFR BLD: 0 % (ref 0–7)
EPITH CASTS URNS QL MICRO: ABNORMAL /LPF
ERYTHROCYTE [DISTWIDTH] IN BLOOD BY AUTOMATED COUNT: 14.2 % (ref 11.5–14.5)
GLOBULIN SER CALC-MCNC: 5.3 G/DL (ref 2–4)
GLUCOSE SERPL-MCNC: 266 MG/DL (ref 65–100)
GLUCOSE UR STRIP.AUTO-MCNC: NEGATIVE MG/DL
HCT VFR BLD AUTO: 35.3 % (ref 35–47)
HGB BLD-MCNC: 11.5 G/DL (ref 11.5–16)
HGB UR QL STRIP: ABNORMAL
IMM GRANULOCYTES # BLD AUTO: 0.1 K/UL (ref 0–0.04)
IMM GRANULOCYTES NFR BLD AUTO: 1 % (ref 0–0.5)
KETONES UR QL STRIP.AUTO: ABNORMAL MG/DL
LEUKOCYTE ESTERASE UR QL STRIP.AUTO: ABNORMAL
LYMPHOCYTES # BLD: 1.2 K/UL (ref 0.8–3.5)
LYMPHOCYTES NFR BLD: 10 % (ref 12–49)
MCH RBC QN AUTO: 29.7 PG (ref 26–34)
MCHC RBC AUTO-ENTMCNC: 32.6 G/DL (ref 30–36.5)
MCV RBC AUTO: 91.2 FL (ref 80–99)
MONOCYTES # BLD: 1.1 K/UL (ref 0–1)
MONOCYTES NFR BLD: 10 % (ref 5–13)
NEUTS SEG # BLD: 9 K/UL (ref 1.8–8)
NEUTS SEG NFR BLD: 79 % (ref 32–75)
NITRITE UR QL STRIP.AUTO: NEGATIVE
NRBC # BLD: 0 K/UL (ref 0–0.01)
NRBC BLD-RTO: 0 PER 100 WBC
PH UR STRIP: 5 (ref 5–8)
PLATELET # BLD AUTO: 299 K/UL (ref 150–400)
PMV BLD AUTO: 9.3 FL (ref 8.9–12.9)
POTASSIUM SERPL-SCNC: 3.7 MMOL/L (ref 3.5–5.1)
PROT SERPL-MCNC: 8.3 G/DL (ref 6.4–8.2)
PROT UR STRIP-MCNC: >300 MG/DL
RBC # BLD AUTO: 3.87 M/UL (ref 3.8–5.2)
RBC #/AREA URNS HPF: ABNORMAL /HPF (ref 0–5)
SODIUM SERPL-SCNC: 141 MMOL/L (ref 136–145)
SP GR UR REFRACTOMETRY: 1.03
TROPONIN-HIGH SENSITIVITY: 51 NG/L (ref 0–51)
TROPONIN-HIGH SENSITIVITY: 55 NG/L (ref 0–51)
UA: UC IF INDICATED,UAUC: ABNORMAL
UROBILINOGEN UR QL STRIP.AUTO: 0.2 EU/DL (ref 0.2–1)
WBC # BLD AUTO: 11.3 K/UL (ref 3.6–11)
WBC URNS QL MICRO: ABNORMAL /HPF (ref 0–4)

## 2023-01-18 PROCEDURE — 99285 EMERGENCY DEPT VISIT HI MDM: CPT

## 2023-01-18 PROCEDURE — 70450 CT HEAD/BRAIN W/O DYE: CPT

## 2023-01-18 PROCEDURE — 93005 ELECTROCARDIOGRAM TRACING: CPT

## 2023-01-18 PROCEDURE — 73502 X-RAY EXAM HIP UNI 2-3 VIEWS: CPT

## 2023-01-18 PROCEDURE — 74011000258 HC RX REV CODE- 258: Performed by: STUDENT IN AN ORGANIZED HEALTH CARE EDUCATION/TRAINING PROGRAM

## 2023-01-18 PROCEDURE — 85025 COMPLETE CBC W/AUTO DIFF WBC: CPT

## 2023-01-18 PROCEDURE — 81001 URINALYSIS AUTO W/SCOPE: CPT

## 2023-01-18 PROCEDURE — 73560 X-RAY EXAM OF KNEE 1 OR 2: CPT

## 2023-01-18 PROCEDURE — 74011250636 HC RX REV CODE- 250/636: Performed by: STUDENT IN AN ORGANIZED HEALTH CARE EDUCATION/TRAINING PROGRAM

## 2023-01-18 PROCEDURE — 84484 ASSAY OF TROPONIN QUANT: CPT

## 2023-01-18 PROCEDURE — 71045 X-RAY EXAM CHEST 1 VIEW: CPT

## 2023-01-18 PROCEDURE — 83880 ASSAY OF NATRIURETIC PEPTIDE: CPT

## 2023-01-18 PROCEDURE — 96365 THER/PROPH/DIAG IV INF INIT: CPT

## 2023-01-18 PROCEDURE — 93971 EXTREMITY STUDY: CPT

## 2023-01-18 PROCEDURE — 36415 COLL VENOUS BLD VENIPUNCTURE: CPT

## 2023-01-18 PROCEDURE — 80053 COMPREHEN METABOLIC PANEL: CPT

## 2023-01-18 RX ORDER — LANOLIN ALCOHOL/MO/W.PET/CERES
400 CREAM (GRAM) TOPICAL DAILY
COMMUNITY

## 2023-01-18 RX ORDER — METOPROLOL TARTRATE 50 MG/1
50 TABLET ORAL 2 TIMES DAILY
COMMUNITY

## 2023-01-18 RX ORDER — ASPIRIN 81 MG/1
81 TABLET ORAL DAILY
COMMUNITY

## 2023-01-18 RX ORDER — METFORMIN HYDROCHLORIDE 500 MG/1
500 TABLET ORAL 2 TIMES DAILY WITH MEALS
COMMUNITY

## 2023-01-18 RX ORDER — GLIPIZIDE 10 MG/1
10 TABLET ORAL 2 TIMES DAILY
COMMUNITY

## 2023-01-18 RX ORDER — ERGOCALCIFEROL 1.25 MG/1
50000 CAPSULE ORAL
COMMUNITY

## 2023-01-18 RX ORDER — ATORVASTATIN CALCIUM 80 MG/1
80 TABLET, FILM COATED ORAL DAILY
COMMUNITY

## 2023-01-18 RX ORDER — SERTRALINE HYDROCHLORIDE 50 MG/1
50 TABLET, FILM COATED ORAL DAILY
COMMUNITY

## 2023-01-18 RX ORDER — MONTELUKAST SODIUM 10 MG/1
10 TABLET ORAL
COMMUNITY

## 2023-01-18 RX ORDER — LOSARTAN POTASSIUM 100 MG/1
100 TABLET ORAL DAILY
COMMUNITY

## 2023-01-18 RX ORDER — TRAZODONE HYDROCHLORIDE 50 MG/1
50 TABLET ORAL 2 TIMES DAILY
COMMUNITY
End: 2023-01-26

## 2023-01-18 RX ORDER — ESOMEPRAZOLE MAGNESIUM 40 MG/1
40 CAPSULE, DELAYED RELEASE ORAL DAILY
COMMUNITY

## 2023-01-18 RX ADMIN — SODIUM CHLORIDE 1 G: 900 INJECTION INTRAVENOUS at 23:15

## 2023-01-19 ENCOUNTER — APPOINTMENT (OUTPATIENT)
Dept: CT IMAGING | Age: 83
End: 2023-01-19
Attending: PHYSICIAN ASSISTANT
Payer: MEDICARE

## 2023-01-19 PROBLEM — M25.562 KNEE PAIN, LEFT: Status: ACTIVE | Noted: 2023-01-19

## 2023-01-19 LAB
ANION GAP SERPL CALC-SCNC: 8 MMOL/L (ref 5–15)
ATRIAL RATE: 108 BPM
ATRIAL RATE: 96 BPM
BUN SERPL-MCNC: 38 MG/DL (ref 6–20)
BUN/CREAT SERPL: 34 (ref 12–20)
CALCIUM SERPL-MCNC: 9.3 MG/DL (ref 8.5–10.1)
CALCULATED P AXIS, ECG09: 34 DEGREES
CALCULATED P AXIS, ECG09: 66 DEGREES
CALCULATED R AXIS, ECG10: -3 DEGREES
CALCULATED R AXIS, ECG10: -8 DEGREES
CALCULATED T AXIS, ECG11: 23 DEGREES
CALCULATED T AXIS, ECG11: 36 DEGREES
CHLORIDE SERPL-SCNC: 111 MMOL/L (ref 97–108)
CO2 SERPL-SCNC: 22 MMOL/L (ref 21–32)
CREAT SERPL-MCNC: 1.13 MG/DL (ref 0.55–1.02)
DIAGNOSIS, 93000: NORMAL
DIAGNOSIS, 93000: NORMAL
GLUCOSE BLD STRIP.AUTO-MCNC: 107 MG/DL (ref 65–117)
GLUCOSE BLD STRIP.AUTO-MCNC: 135 MG/DL (ref 65–117)
GLUCOSE BLD STRIP.AUTO-MCNC: 161 MG/DL (ref 65–117)
GLUCOSE BLD STRIP.AUTO-MCNC: 63 MG/DL (ref 65–117)
GLUCOSE BLD STRIP.AUTO-MCNC: 67 MG/DL (ref 65–117)
GLUCOSE SERPL-MCNC: 251 MG/DL (ref 65–100)
P-R INTERVAL, ECG05: 180 MS
P-R INTERVAL, ECG05: 188 MS
POTASSIUM SERPL-SCNC: 4 MMOL/L (ref 3.5–5.1)
Q-T INTERVAL, ECG07: 356 MS
Q-T INTERVAL, ECG07: 370 MS
QRS DURATION, ECG06: 80 MS
QRS DURATION, ECG06: 88 MS
QTC CALCULATION (BEZET), ECG08: 467 MS
QTC CALCULATION (BEZET), ECG08: 477 MS
SERVICE CMNT-IMP: ABNORMAL
SERVICE CMNT-IMP: NORMAL
SERVICE CMNT-IMP: NORMAL
SODIUM SERPL-SCNC: 141 MMOL/L (ref 136–145)
VENTRICULAR RATE, ECG03: 108 BPM
VENTRICULAR RATE, ECG03: 96 BPM

## 2023-01-19 PROCEDURE — 97535 SELF CARE MNGMENT TRAINING: CPT

## 2023-01-19 PROCEDURE — 97530 THERAPEUTIC ACTIVITIES: CPT

## 2023-01-19 PROCEDURE — 74011636637 HC RX REV CODE- 636/637: Performed by: STUDENT IN AN ORGANIZED HEALTH CARE EDUCATION/TRAINING PROGRAM

## 2023-01-19 PROCEDURE — 36415 COLL VENOUS BLD VENIPUNCTURE: CPT

## 2023-01-19 PROCEDURE — 97161 PT EVAL LOW COMPLEX 20 MIN: CPT | Performed by: PHYSICAL THERAPIST

## 2023-01-19 PROCEDURE — 74011000258 HC RX REV CODE- 258: Performed by: STUDENT IN AN ORGANIZED HEALTH CARE EDUCATION/TRAINING PROGRAM

## 2023-01-19 PROCEDURE — 96375 TX/PRO/DX INJ NEW DRUG ADDON: CPT

## 2023-01-19 PROCEDURE — 97530 THERAPEUTIC ACTIVITIES: CPT | Performed by: PHYSICAL THERAPIST

## 2023-01-19 PROCEDURE — 65270000029 HC RM PRIVATE

## 2023-01-19 PROCEDURE — G0378 HOSPITAL OBSERVATION PER HR: HCPCS

## 2023-01-19 PROCEDURE — 73700 CT LOWER EXTREMITY W/O DYE: CPT

## 2023-01-19 PROCEDURE — 93005 ELECTROCARDIOGRAM TRACING: CPT

## 2023-01-19 PROCEDURE — 74011000250 HC RX REV CODE- 250: Performed by: STUDENT IN AN ORGANIZED HEALTH CARE EDUCATION/TRAINING PROGRAM

## 2023-01-19 PROCEDURE — 74011250637 HC RX REV CODE- 250/637: Performed by: STUDENT IN AN ORGANIZED HEALTH CARE EDUCATION/TRAINING PROGRAM

## 2023-01-19 PROCEDURE — 74011250636 HC RX REV CODE- 250/636: Performed by: INTERNAL MEDICINE

## 2023-01-19 PROCEDURE — 74011250636 HC RX REV CODE- 250/636: Performed by: STUDENT IN AN ORGANIZED HEALTH CARE EDUCATION/TRAINING PROGRAM

## 2023-01-19 PROCEDURE — 96376 TX/PRO/DX INJ SAME DRUG ADON: CPT

## 2023-01-19 PROCEDURE — 80048 BASIC METABOLIC PNL TOTAL CA: CPT

## 2023-01-19 PROCEDURE — 96372 THER/PROPH/DIAG INJ SC/IM: CPT

## 2023-01-19 PROCEDURE — 94762 N-INVAS EAR/PLS OXIMTRY CONT: CPT

## 2023-01-19 PROCEDURE — 97166 OT EVAL MOD COMPLEX 45 MIN: CPT

## 2023-01-19 PROCEDURE — 74011250637 HC RX REV CODE- 250/637: Performed by: INTERNAL MEDICINE

## 2023-01-19 PROCEDURE — 82962 GLUCOSE BLOOD TEST: CPT

## 2023-01-19 RX ORDER — MELOXICAM 15 MG/1
15 TABLET ORAL DAILY
COMMUNITY
End: 2023-01-26

## 2023-01-19 RX ORDER — DEXTROSE MONOHYDRATE 100 MG/ML
0-250 INJECTION, SOLUTION INTRAVENOUS AS NEEDED
Status: DISCONTINUED | OUTPATIENT
Start: 2023-01-19 | End: 2023-01-26 | Stop reason: HOSPADM

## 2023-01-19 RX ORDER — HYDRALAZINE HYDROCHLORIDE 20 MG/ML
10 INJECTION INTRAMUSCULAR; INTRAVENOUS
Status: DISCONTINUED | OUTPATIENT
Start: 2023-01-19 | End: 2023-01-26 | Stop reason: HOSPADM

## 2023-01-19 RX ORDER — ASPIRIN 81 MG/1
81 TABLET ORAL DAILY
Status: DISCONTINUED | OUTPATIENT
Start: 2023-01-19 | End: 2023-01-26 | Stop reason: HOSPADM

## 2023-01-19 RX ORDER — SERTRALINE HYDROCHLORIDE 50 MG/1
50 TABLET, FILM COATED ORAL DAILY
Status: DISCONTINUED | OUTPATIENT
Start: 2023-01-19 | End: 2023-01-26 | Stop reason: HOSPADM

## 2023-01-19 RX ORDER — IBUPROFEN 200 MG
4 TABLET ORAL AS NEEDED
Status: DISCONTINUED | OUTPATIENT
Start: 2023-01-19 | End: 2023-01-26 | Stop reason: HOSPADM

## 2023-01-19 RX ORDER — ATORVASTATIN CALCIUM 40 MG/1
80 TABLET, FILM COATED ORAL DAILY
Status: DISCONTINUED | OUTPATIENT
Start: 2023-01-19 | End: 2023-01-26 | Stop reason: HOSPADM

## 2023-01-19 RX ORDER — POLYETHYLENE GLYCOL 3350 17 G/17G
17 POWDER, FOR SOLUTION ORAL DAILY PRN
Status: DISCONTINUED | OUTPATIENT
Start: 2023-01-19 | End: 2023-01-26 | Stop reason: HOSPADM

## 2023-01-19 RX ORDER — ACETAMINOPHEN 325 MG/1
650 TABLET ORAL
Status: DISCONTINUED | OUTPATIENT
Start: 2023-01-19 | End: 2023-01-26 | Stop reason: HOSPADM

## 2023-01-19 RX ORDER — OXYCODONE HYDROCHLORIDE 5 MG/1
5 TABLET ORAL
Status: DISCONTINUED | OUTPATIENT
Start: 2023-01-19 | End: 2023-01-26 | Stop reason: HOSPADM

## 2023-01-19 RX ORDER — INSULIN LISPRO 100 [IU]/ML
INJECTION, SOLUTION INTRAVENOUS; SUBCUTANEOUS
Status: DISCONTINUED | OUTPATIENT
Start: 2023-01-19 | End: 2023-01-26 | Stop reason: HOSPADM

## 2023-01-19 RX ORDER — ENOXAPARIN SODIUM 100 MG/ML
40 INJECTION SUBCUTANEOUS DAILY
Status: DISCONTINUED | OUTPATIENT
Start: 2023-01-19 | End: 2023-01-26 | Stop reason: HOSPADM

## 2023-01-19 RX ORDER — ONDANSETRON 2 MG/ML
4 INJECTION INTRAMUSCULAR; INTRAVENOUS
Status: DISCONTINUED | OUTPATIENT
Start: 2023-01-19 | End: 2023-01-26 | Stop reason: HOSPADM

## 2023-01-19 RX ORDER — PANTOPRAZOLE SODIUM 40 MG/1
40 TABLET, DELAYED RELEASE ORAL
Status: DISCONTINUED | OUTPATIENT
Start: 2023-01-19 | End: 2023-01-26 | Stop reason: HOSPADM

## 2023-01-19 RX ORDER — TRAZODONE HYDROCHLORIDE 50 MG/1
50 TABLET ORAL
Status: DISCONTINUED | OUTPATIENT
Start: 2023-01-19 | End: 2023-01-26 | Stop reason: HOSPADM

## 2023-01-19 RX ORDER — SODIUM CHLORIDE 9 MG/ML
50 INJECTION, SOLUTION INTRAVENOUS CONTINUOUS
Status: DISPENSED | OUTPATIENT
Start: 2023-01-19 | End: 2023-01-19

## 2023-01-19 RX ORDER — METOPROLOL TARTRATE 50 MG/1
50 TABLET ORAL 2 TIMES DAILY
Status: DISCONTINUED | OUTPATIENT
Start: 2023-01-19 | End: 2023-01-26 | Stop reason: HOSPADM

## 2023-01-19 RX ORDER — ACETAMINOPHEN 500 MG
1000 TABLET ORAL ONCE
Status: COMPLETED | OUTPATIENT
Start: 2023-01-19 | End: 2023-01-19

## 2023-01-19 RX ORDER — DICLOFENAC SODIUM 10 MG/G
4 GEL TOPICAL 4 TIMES DAILY
Status: DISCONTINUED | OUTPATIENT
Start: 2023-01-19 | End: 2023-01-26 | Stop reason: HOSPADM

## 2023-01-19 RX ORDER — SODIUM CHLORIDE 0.9 % (FLUSH) 0.9 %
5-40 SYRINGE (ML) INJECTION AS NEEDED
Status: DISCONTINUED | OUTPATIENT
Start: 2023-01-19 | End: 2023-01-26 | Stop reason: HOSPADM

## 2023-01-19 RX ORDER — ONDANSETRON 4 MG/1
4 TABLET, ORALLY DISINTEGRATING ORAL
Status: DISCONTINUED | OUTPATIENT
Start: 2023-01-19 | End: 2023-01-26 | Stop reason: HOSPADM

## 2023-01-19 RX ORDER — MONTELUKAST SODIUM 10 MG/1
10 TABLET ORAL
Status: DISCONTINUED | OUTPATIENT
Start: 2023-01-19 | End: 2023-01-26 | Stop reason: HOSPADM

## 2023-01-19 RX ORDER — GLIPIZIDE 5 MG/1
5 TABLET ORAL
Status: DISCONTINUED | OUTPATIENT
Start: 2023-01-19 | End: 2023-01-26 | Stop reason: HOSPADM

## 2023-01-19 RX ORDER — SODIUM CHLORIDE 0.9 % (FLUSH) 0.9 %
5-40 SYRINGE (ML) INJECTION EVERY 8 HOURS
Status: DISCONTINUED | OUTPATIENT
Start: 2023-01-19 | End: 2023-01-26 | Stop reason: HOSPADM

## 2023-01-19 RX ORDER — ACETAMINOPHEN 650 MG/1
650 SUPPOSITORY RECTAL
Status: DISCONTINUED | OUTPATIENT
Start: 2023-01-19 | End: 2023-01-26 | Stop reason: HOSPADM

## 2023-01-19 RX ADMIN — METOPROLOL TARTRATE 50 MG: 50 TABLET, FILM COATED ORAL at 08:15

## 2023-01-19 RX ADMIN — PANTOPRAZOLE SODIUM 40 MG: 40 TABLET, DELAYED RELEASE ORAL at 09:29

## 2023-01-19 RX ADMIN — ATORVASTATIN CALCIUM 80 MG: 40 TABLET, FILM COATED ORAL at 09:29

## 2023-01-19 RX ADMIN — CEFTRIAXONE 1 G: 1 INJECTION, POWDER, FOR SOLUTION INTRAMUSCULAR; INTRAVENOUS at 22:04

## 2023-01-19 RX ADMIN — MONTELUKAST 10 MG: 10 TABLET, FILM COATED ORAL at 21:51

## 2023-01-19 RX ADMIN — MONTELUKAST 10 MG: 10 TABLET, FILM COATED ORAL at 00:56

## 2023-01-19 RX ADMIN — ACETAMINOPHEN 650 MG: 325 TABLET ORAL at 20:30

## 2023-01-19 RX ADMIN — SERTRALINE 50 MG: 50 TABLET, FILM COATED ORAL at 09:29

## 2023-01-19 RX ADMIN — OXYCODONE HYDROCHLORIDE 5 MG: 5 TABLET ORAL at 17:56

## 2023-01-19 RX ADMIN — OXYCODONE HYDROCHLORIDE 5 MG: 5 TABLET ORAL at 21:51

## 2023-01-19 RX ADMIN — SODIUM CHLORIDE, PRESERVATIVE FREE 10 ML: 5 INJECTION INTRAVENOUS at 21:52

## 2023-01-19 RX ADMIN — OXYCODONE HYDROCHLORIDE 5 MG: 5 TABLET ORAL at 08:06

## 2023-01-19 RX ADMIN — Medication 2 UNITS: at 07:30

## 2023-01-19 RX ADMIN — Medication 16 G: at 16:49

## 2023-01-19 RX ADMIN — ENOXAPARIN SODIUM 40 MG: 100 INJECTION SUBCUTANEOUS at 09:30

## 2023-01-19 RX ADMIN — SODIUM CHLORIDE, PRESERVATIVE FREE 10 ML: 5 INJECTION INTRAVENOUS at 17:57

## 2023-01-19 RX ADMIN — ACETAMINOPHEN 1000 MG: 500 TABLET ORAL at 00:56

## 2023-01-19 RX ADMIN — METOPROLOL TARTRATE 50 MG: 50 TABLET, FILM COATED ORAL at 17:56

## 2023-01-19 RX ADMIN — SODIUM CHLORIDE 50 ML/HR: 9 INJECTION, SOLUTION INTRAVENOUS at 03:02

## 2023-01-19 RX ADMIN — HYDRALAZINE HYDROCHLORIDE 10 MG: 20 INJECTION INTRAMUSCULAR; INTRAVENOUS at 09:33

## 2023-01-19 RX ADMIN — GLIPIZIDE 5 MG: 5 TABLET ORAL at 09:29

## 2023-01-19 RX ADMIN — METOPROLOL TARTRATE 50 MG: 50 TABLET, FILM COATED ORAL at 00:56

## 2023-01-19 RX ADMIN — ASPIRIN 81 MG: 81 TABLET, COATED ORAL at 09:29

## 2023-01-19 RX ADMIN — SODIUM CHLORIDE, PRESERVATIVE FREE 10 ML: 5 INJECTION INTRAVENOUS at 09:31

## 2023-01-19 NOTE — ED PROVIDER NOTES
[facility] [address]       Pt Name: Lauren Garzon  MRN: [id number]  [place] 1940  Date of evaluation: 1/18/2023  Provider: Susan Hernandez DO   PCP: Jody Yoo MD  Note Started: 11:21 PM 1/18/23     CHIEF COMPLAINT       Chief Complaint   Patient presents with    Syncope     EMS reports pt was weak, moved self to floor. Family reports she had slurred speech and was not herself. EMS reports pt A&O for them, complains on left hip pain and left knee swelling    Knee Pain     Per patient, she was experiencing leg pain and she lowered herself to the ground; patient's daughters endorsed this encounter is correct. Daughters noted on Monday she slurred speech and delayed responses, states that she has a hx of TIAs but that those symptoms occurred the last time she had a UTI. HISTORY OF PRESENT ILLNESS: 1 or more elements      History From: Patient and Patient's Daughter  HPI Limitations : None     Lauren Garzon is a 80 y.o. female who presents with c/c of syncope, fall. Per EMS, they state the patient was weak and had a syncopal episode. Family had reported slurred speech and stating that patient was altered. Patient was complaining of left hip and left knee swelling per EMS. On patient's daughter's arrival, they state that patient did not have a syncopal episode but was leaning over to diclofenac gel on her left knee as it has been bothering her and fell over, twisting her knee when she fell. They do note the patient has had some delayed responses, based deny any slurred speech, unilateral numbness or weakness but states that she typically gets the symptoms when she has a UTI. They do report history of TIAs but states that her presentation is more consistent with when she has had a UTI in the past.  They do note that her left knee has been progressively more painful over the last several days and after the fall today patient has been unable to walk.   They state that EMS had to lift patient into the stretcher she was unable to stand on her left knee. Nursing Notes were all reviewed and agreed with or any disagreements were addressed in the HPI. REVIEW OF SYSTEMS      Review of Systems   Constitutional:  Negative for chills and fever. Respiratory:  Negative for shortness of breath. Cardiovascular:  Negative for chest pain. Gastrointestinal:  Negative for abdominal pain, diarrhea, nausea and vomiting. Genitourinary:  Negative for dysuria and hematuria. Musculoskeletal:  Positive for arthralgias. Neurological:  Positive for speech difficulty. Negative for dizziness and headaches. Positives and Pertinent negatives as per HPI. PAST HISTORY     Past Medical History:  No past medical history on file. Past Surgical History:  Past Surgical History:   Procedure Laterality Date    HX BREAST BIOPSY Right        Family History:  Family History   Problem Relation Age of Onset    Breast Cancer Sister        Social History: Allergies: Allergies   Allergen Reactions    Latex Swelling    Ace Inhibitors Angioedema    Iodine Angioedema    Tramadol Nausea and Vomiting       CURRENT MEDICATIONS      Current Discharge Medication List        CONTINUE these medications which have NOT CHANGED    Details   aspirin delayed-release 81 mg tablet Take 81 mg by mouth daily. sertraline (ZOLOFT) 50 mg tablet Take 50 mg by mouth daily. montelukast (SINGULAIR) 10 mg tablet Take 10 mg by mouth nightly.      magnesium oxide (MAG-OX) 400 mg tablet Take 400 mg by mouth daily. atorvastatin (LIPITOR) 80 mg tablet Take 80 mg by mouth daily. esomeprazole (NEXIUM) 40 mg capsule Take 40 mg by mouth daily. losartan (COZAAR) 100 mg tablet Take 100 mg by mouth daily. traZODone (DESYREL) 50 mg tablet Take 50 mg by mouth two (2) times a day. metoprolol tartrate (LOPRESSOR) 50 mg tablet Take 50 mg by mouth two (2) times a day.       metFORMIN (GLUCOPHAGE) 500 mg tablet Take 500 mg by mouth two (2) times daily (with meals). glipiZIDE (GLUCOTROL) 10 mg tablet Take 10 mg by mouth two (2) times a day. ergocalciferol (ERGOCALCIFEROL) 1,250 mcg (50,000 unit) capsule Take 50,000 Units by mouth every Sunday. meloxicam (MOBIC) 15 mg tablet Take 15 mg by mouth daily. SCREENINGS               No data recorded         PHYSICAL EXAM      ED Triage Vitals [01/18/23 1830]   ED Encounter Vitals Group      BP (!) 186/99      Pulse (Heart Rate) 88      Resp Rate 16      Temp 98.8 °F (37.1 °C)      Temp src       O2 Sat (%) 97 %      Weight 197 lb      Height         Physical Exam  Vitals and nursing note reviewed. Constitutional:       Appearance: Normal appearance. HENT:      Head: Normocephalic and atraumatic. Mouth/Throat:      Mouth: Mucous membranes are moist.   Eyes:      Conjunctiva/sclera: Conjunctivae normal.   Cardiovascular:      Rate and Rhythm: Normal rate and regular rhythm. Pulmonary:      Effort: Pulmonary effort is normal.      Breath sounds: Normal breath sounds. Abdominal:      General: Abdomen is flat. Palpations: Abdomen is soft. Musculoskeletal:      Right lower leg: No edema. Left lower leg: No edema. Comments: + L knee swelling, TTP to the posterior aspect of hte left knee, +slightly warm, intact ROM   Skin:     General: Skin is warm. Capillary Refill: Capillary refill takes less than 2 seconds. Neurological:      Mental Status: She is alert. Mental status is at baseline.           DIAGNOSTIC RESULTS   LABS:     Recent Results (from the past 12 hour(s))   CBC WITH AUTOMATED DIFF    Collection Time: 01/20/23  2:12 AM   Result Value Ref Range    WBC 9.5 3.6 - 11.0 K/uL    RBC 3.32 (L) 3.80 - 5.20 M/uL    HGB 9.7 (L) 11.5 - 16.0 g/dL    HCT 30.8 (L) 35.0 - 47.0 %    MCV 92.8 80.0 - 99.0 FL    MCH 29.2 26.0 - 34.0 PG    MCHC 31.5 30.0 - 36.5 g/dL    RDW 14.2 11.5 - 14.5 %    PLATELET 341 729 - 840 K/uL MPV 9.7 8.9 - 12.9 FL    NRBC 0.0 0  WBC    ABSOLUTE NRBC 0.00 0.00 - 0.01 K/uL    NEUTROPHILS 62 32 - 75 %    LYMPHOCYTES 27 12 - 49 %    MONOCYTES 7 5 - 13 %    EOSINOPHILS 4 0 - 7 %    BASOPHILS 0 0 - 1 %    IMMATURE GRANULOCYTES 0 0.0 - 0.5 %    ABS. NEUTROPHILS 5.7 1.8 - 8.0 K/UL    ABS. LYMPHOCYTES 2.6 0.8 - 3.5 K/UL    ABS. MONOCYTES 0.7 0.0 - 1.0 K/UL    ABS. EOSINOPHILS 0.4 0.0 - 0.4 K/UL    ABS. BASOPHILS 0.0 0.0 - 0.1 K/UL    ABS. IMM. GRANS. 0.0 0.00 - 0.04 K/UL    DF AUTOMATED     METABOLIC PANEL, BASIC    Collection Time: 01/20/23  2:12 AM   Result Value Ref Range    Sodium 138 136 - 145 mmol/L    Potassium 4.4 3.5 - 5.1 mmol/L    Chloride 108 97 - 108 mmol/L    CO2 24 21 - 32 mmol/L    Anion gap 6 5 - 15 mmol/L    Glucose 142 (H) 65 - 100 mg/dL    BUN 39 (H) 6 - 20 MG/DL    Creatinine 0.94 0.55 - 1.02 MG/DL    BUN/Creatinine ratio 41 (H) 12 - 20      eGFR >60 >60 ml/min/1.73m2    Calcium 8.6 8.5 - 10.1 MG/DL        EKG interpreted by me: sinus rhythm, rate 96, no significant ST elevations or depressions     RADIOLOGY:  Non-plain film images such as CT, Ultrasound and MRI are read by the radiologist. Plain radiographic images are visualized and preliminarily interpreted by the ED Provider with the below findings:       Interpretation per the Radiologist below, if available at the time of this note:     CT LOW EXT LT WO CONT    Result Date: 1/19/2023  INDICATION:  , r/o occult fracture EXAM: CT left knee. Comparison prior day. Thin section axial images were obtained. From these sagittal and coronal reformats were performed. CT dose reduction was achieved through use of a standardized protocol tailored for this examination and automatic exposure control for dose modulation. FINDINGS: There is a large joint effusion and moderate-sized Baker's cyst. Bones are osteopenic. Extensive tricompartmental degenerative change there is remodeling of the posterior lateral tibial plateau. No acute fracture. Chondrocalcinosis of the medial meniscus. There are vascular calcifications     1. Extensive tricompartmental degenerative change with large joint effusion.  Osteopenia but no acute fracture        PROCEDURES   Unless otherwise noted below, none  Procedures     CRITICAL CARE TIME       EMERGENCY DEPARTMENT COURSE and DIFFERENTIAL DIAGNOSIS/MDM   Vitals:    Vitals:    01/19/23 1107 01/19/23 1208 01/19/23 1619 01/19/23 1921   BP: (!) 151/67 (!) 141/76 (!) 168/73 (!) 147/70   Pulse: 84 83 73 78   Resp: 16 16 18 18   Temp: 98.4 °F (36.9 °C)  99 °F (37.2 °C) 98.1 °F (36.7 °C)   SpO2: 97% 97% 98% 96%   Weight:       Height:            Patient was given the following medications:  Medications   aspirin delayed-release tablet 81 mg (81 mg Oral Given 1/19/23 0929)   atorvastatin (LIPITOR) tablet 80 mg (80 mg Oral Given 1/19/23 0929)   pantoprazole (PROTONIX) tablet 40 mg (40 mg Oral Given 1/19/23 0929)   oxyCODONE IR (ROXICODONE) tablet 5 mg (5 mg Oral Given 1/20/23 0207)   diclofenac (VOLTAREN) 1 % topical gel 4 g (0 g Topical Held 1/19/23 2200)   metoprolol tartrate (LOPRESSOR) tablet 50 mg (50 mg Oral Given 1/19/23 1756)   montelukast (SINGULAIR) tablet 10 mg (10 mg Oral Given 1/19/23 2151)   sertraline (ZOLOFT) tablet 50 mg (50 mg Oral Given 1/19/23 0929)   traZODone (DESYREL) tablet 50 mg (has no administration in time range)   sodium chloride (NS) flush 5-40 mL (10 mL IntraVENous Given 1/20/23 0538)   sodium chloride (NS) flush 5-40 mL (has no administration in time range)   acetaminophen (TYLENOL) tablet 650 mg (650 mg Oral Given 1/19/23 2030)     Or   acetaminophen (TYLENOL) suppository 650 mg ( Rectal See Alternative 1/19/23 2030)   polyethylene glycol (MIRALAX) packet 17 g (has no administration in time range)   ondansetron (ZOFRAN ODT) tablet 4 mg (has no administration in time range)     Or   ondansetron (ZOFRAN) injection 4 mg (has no administration in time range)   enoxaparin (LOVENOX) injection 40 mg (40 mg SubCUTAneous Given 1/19/23 0930)   insulin lispro (HUMALOG) injection (0 Units SubCUTAneous Held 1/19/23 2200)   glucose chewable tablet 16 g (16 g Oral Given 1/19/23 1649)   glucagon (GLUCAGEN) injection 1 mg (has no administration in time range)   dextrose 10% infusion 0-250 mL (has no administration in time range)   0.9% sodium chloride infusion (0 mL/hr IntraVENous Stopped 1/19/23 1756)   cefTRIAXone (ROCEPHIN) 1 g in 0.9% sodium chloride (MBP/ADV) 50 mL MBP (1 g IntraVENous New Bag 1/19/23 2204)   hydrALAZINE (APRESOLINE) 20 mg/mL injection 10 mg (10 mg IntraVENous Given 1/19/23 0933)   glipiZIDE (GLUCOTROL) tablet 5 mg (0 mg Oral Held 1/19/23 1630)   cefTRIAXone (ROCEPHIN) 1 g in 0.9% sodium chloride (MBP/ADV) 50 mL MBP (0 g IntraVENous IV Completed 1/18/23 2345)   acetaminophen (TYLENOL) tablet 1,000 mg (1,000 mg Oral Given 1/19/23 0056)       CONSULTS: (Who and What was discussed)  IP CONSULT TO ORTHOPEDIC SURGERY    Chronic Conditions: hypertension, diabetes, dementia    Social Determinants affecting Dx or Tx: None    Records Reviewed (source and summary of external notes): Nursing Notes, Previous Radiology Studies, and Previous Laboratory Studies    CC/HPI Summary, DDx, ED Course, and Reassessment: Patient presenting with c/c of fall, left knee pain. On exam she is hemodynamically stable, vital signs are stable, her neurologic exam is intact. +L knee is moderately tender to palpation with intact ROM. Differentials include electrolyte abnormality, UTI, anemia, ICH, stroke, TIA. Consider Baker's cyst versus DVT versus osteoarthritis versus sprain versus strain versus fracture. Lower suspicion for septic joint given patient is afebrile, Denies not significantly tender to palpation, erythematous, it is mildly edematous, likely due to arthritis versus gout versus trauma rather than septic joint.   Will obtain basic labs, UA, CBC, CMP, CT head,,  Chest x-ray      UA with evidence of UTI, lab work otherwise unremarkable. CT head negative. No fracture on plain films of the knee and the hip. I discussed plan of care for discharge with patient's family with antibiotics for UTI. They are extremely concerned regarding patient's mobility at home. They feel as if they cannot take her home and care for herself and her knee pain. I offered patient and patient's daughter a joint arthrocentesis and they declined. They stated that they would consider this for tomorrow. Will admit to hospitalist for ambulatory dysfunction. Disposition Considerations (Tests not done, Shared Decision Making, Pt Expectation of Test or Tx.): Considered arthrocentesis and discussed with patient and daughters, they declined    FINAL IMPRESSION     1. Fall, initial encounter    2. Acute pain of left knee          DISPOSITION/PLAN   Admitted    Discharge Note: The patient is stable for discharge home. The signs, symptoms, diagnosis, and discharge instructions have been discussed, understanding conveyed, and agreed upon. The patient is to follow up as recommended or return to ER should their symptoms worsen. PATIENT REFERRED TO:  Follow-up Information    None           DISCHARGE MEDICATIONS:  Current Discharge Medication List            DISCONTINUED MEDICATIONS:  Current Discharge Medication List            (Please note that parts of this dictation were completed with voice recognition software. Quite often unanticipated grammatical, syntax, homophones, and other interpretive errors are inadvertently transcribed by the computer software. Please disregards these errors.  Please excuse any errors that have escaped final proofreading.)    Reuben Upton, DO

## 2023-01-19 NOTE — ED NOTES
ADMISSION SBAR NOTE    IP UNIT CALLED NOTE IS READY: Yes Spoke to Yunno  IF there are questions Call Rd Vera at phone # [phone]    SITUATION/BACKGROUND:    Patient is being transferred to [facility] , Room# 109    Patient's Chief Complaint was L Knee Pain and is admitted for L Knee Pain. CODE STATUS: Full Code    ISOLATION/PRECAUTIONS: Yes   ISOLATION TYPE: None    Called outstanding consults: No      Are there still sign and held orders that need to be released? No     STAT labs collected: Yes  REPEAT LACTIC ACID DUE? No  TIME DUE: x    All STAT orders are complete: Yes    The following personal items will be sent with the patient during transfer to the floor: All valuables:   ITEM:    ITEM: Visual Aid: Glasses  ITEM:    ITEM:    ITEM:         ASSESSMENT:    CIWA Assessment: No  Last Score: x    NEURO:     NIH SCORE:    MARILIN SCREENING:      NEURO ASSESSMENT:      Is patient impulsive? No   Is patient oriented? Yes   Do they follow commands? Yes  Is the patient ambulatory? No  Device need: Nurse assist    FALL RISK? Yes   INTERVENTIONS: High fall risk interventions    RESPIRATORY: Is patient on Oxygen? No    OXYGEN: Oxygen Therapy  O2 Device: None (Room air) (01/19/23 0315)    CARDIAC: Is cardiac monitoring ordered? No  Last Rhythm: Sinus Rhythm  Patient to transfer with tele box on? No   Is patient using a LIFE VEST? No     LINE ACCESS:   Peripheral IV 01/18/23 Right Antecubital (Active)   Site Assessment Clean, dry, & intact 01/19/23 0333   Phlebitis Assessment 0 01/19/23 0333   Infiltration Assessment 0 01/19/23 0333   Dressing Status Clean, dry, & intact 01/19/23 0333   Dressing Type Transparent 01/19/23 0333   Hub Color/Line Status Pink;Patent 01/19/23 0333   Action Taken Tubing changed 01/19/23 0333        /GI: CONTINENT BOWEL/BLADDER? No   URINARY OUTPUT: voiding and external catheter  CHRONIC OR ACUTE? chronic   If CHRONIC, is it 1days old, was it changed prior to specimen collection?  Yes  WAS UA WITH REFLEX SENT TO LAB? Yes IF NO, COLLECT AND SEND PRIOR TO TRANSPORT TO INPATIENT AREA    INTEGUMENTARY:   IS THE PATIENT UNDRESSED? Yes  ARE THERE WOUNDS PRESENT? Yes  ARE THE WOUNDS DOCUMENTED? Yes    RESTRAINTS IN USE: No      IS DOCUMENTATION COMPLETE: Yes  Is there a current Order?  No  When does it ? x    Vital Signs  Level of Consciousness: Alert (0) (23)  Temp: 98.9 °F (37.2 °C) (23)  Temp Source: Oral (23)  Pulse (Heart Rate): 77 (23)  Heart Rate Source: Monitor (23)  Cardiac Rhythm: Sinus Rhythm (23)  Resp Rate: 30 (23)  BP: (!) 148/70 (23)  MAP (Monitor): 94 (23)  MAP (Calculated): 96 (23)  BP 1 Location: Left upper arm (23)  BP 1 Method: Automatic (23)  BP Patient Position: At rest (23)  MEWS Score: 2 (23)  Pain 1  Pain Scale 1: Numeric (0 - 10) (23)  Pain Intensity 1: 9 (23)      REVIEW:

## 2023-01-19 NOTE — PROGRESS NOTES
Physical Therapy  PT evaluation completed; full note to follow. Patient currently requiring mod/max assist for all mobility. Patient able to stand with walker but unable to take any steps due to left knee pain. Patient will likely need SNF at discharge.

## 2023-01-19 NOTE — PROGRESS NOTES
Still c/o knee pain . Worse with weight bearing.   Wbc 11.3    Patient Vitals for the past 24 hrs:   Temp Pulse Resp BP SpO2   01/19/23 1208 -- 83 16 (!) 141/76 97 %   01/19/23 1107 98.4 °F (36.9 °C) 84 16 (!) 151/67 97 %   01/19/23 0932 -- -- -- (!) 166/69 --   01/19/23 0757 98.2 °F (36.8 °C) 79 22 (!) 160/79 100 %   01/19/23 0424 98.2 °F (36.8 °C) 78 26 (!) 170/89 99 %   01/19/23 0346 -- 77 30 -- 94 %   01/19/23 0345 -- 76 (!) 31 (!) 148/70 93 %   01/19/23 0330 -- 81 25 -- 92 %   01/19/23 0315 98.9 °F (37.2 °C) 74 26 136/64 93 %   01/19/23 0300 -- 76 30 (!) 140/71 93 %   01/19/23 0245 -- 81 28 133/73 92 %   01/19/23 0230 -- 72 28 (!) 141/72 92 %   01/19/23 0215 -- 73 29 138/77 92 %   01/19/23 0200 -- 78 30 (!) 144/86 95 %   01/19/23 0145 -- 77 (!) 31 132/73 92 %   01/19/23 0130 -- 88 30 (!) 141/90 93 %   01/19/23 0115 -- (!) 102 27 (!) 145/86 93 %   01/19/23 0030 -- 99 26 (!) 154/79 94 %   01/19/23 0000 -- (!) 102 (!) 31 (!) 166/82 93 %   01/18/23 2345 -- 100 (!) 33 (!) 157/76 93 %   01/18/23 2315 -- 100 (!) 34 -- 96 %   01/18/23 2300 -- 99 26 (!) 172/89 96 %   01/18/23 2245 -- 92 (!) 32 -- 95 %   01/18/23 2230 -- (!) 101 29 -- 97 %   01/18/23 2215 -- 93 28 -- 96 %   01/18/23 2200 -- 81 29 (!) 181/88 97 %   01/18/23 2145 -- 89 30 -- 96 %   01/18/23 2130 -- 85 (!) 33 (!) 168/86 97 %   01/18/23 2015 -- (!) 103 30 -- 96 %   01/18/23 2000 -- (!) 103 (!) 33 (!) 181/107 96 %   01/18/23 1945 -- (!) 103 (!) 32 -- 96 %   01/18/23 1830 98.8 °F (37.1 °C) 88 16 (!) 186/99 97 %     Knee with moderate effusion  M and JLP with palpation  Knee stable  Rom 0-30 with mild discomfort    Was weight bearing until recent  Check ct  r/o occult frcture

## 2023-01-19 NOTE — ED NOTES
Patient resting on stretcher with eyes closed in position of comfort at this time. Respirations are present and unlabored. No distress observed. Side rails in upright position, call light within reach.

## 2023-01-19 NOTE — ED NOTES
Patient resting on stretcher with eyes closed at this time. Family members at bedside. No distress observed.

## 2023-01-19 NOTE — PROGRESS NOTES
Transition of Care Plan:    RUR: 9% - Low  Disposition: SNF - patient voices [facility] as her facility of choice  If SNF or IPR: Date FOC offered: 1/19/2023  Date FOC received: 1/19/2023  Date authorization started with reference number: Will initiate auth with [employer] pending patient acceptance to SNF  Date authorization received and expires:  Accepting facility: pending [facility]  Follow up appointments: TBD  DME needed: TBD - patient has a cane - states \"rollator disappeared from the home\" - she does not know when it was obtained - will need new DME  Transportation at Discharge:EMS vs family   [place] or means to access home:      per family   IM Medicare Letter: will need 2nd IM prior to discharge  Is patient a  and connected with the [place]? N/A  If yes, was [place] transfer form completed and [facility] notified? Caregiver Contact: Daughters - Magy Rocha - [phone] or Urbano Piña - [phone]  Discharge Caregiver contacted prior to discharge? Family will need to be updated on discharge plan  Care Conference needed?:               Not at present time    Reason for Admission:  left knee pain, UTI                     RUR Score:          9% - LOW           Plan for utilizing home health:      patient prefers SNF placement at present time - she is aware that insurance Donnis Certain will be necessary    PCP: First and Last name:  Carolynn Mcneill MD     Name of Practice:    Are you a current patient: Yes/No:    Approximate date of last visit:    Can you participate in a virtual visit with your PCP:                     Current Advanced Directive/Advance Care Plan: Full Code      Healthcare Decision Maker:   Click here to complete [address] including selection of the Healthcare Decision Maker Relationship (ie \"Primary\")    Patient does not have an ACP and declines completing at present time.   She has 4 children           Daughters:  Magy Rocha - [phone]  IYJEXVIBD [facility] - [phone]  Son:  Rashad Hannon                    Transition of Care Plan:              Patient currently lives with her daughter - Johnie Lam - in a private home where she states she has limited ability to move about due to space and number of people in the home. Patient feels she is unable to fully participate with home health in that environment and would like a short SNF stay. Patient uses a cane in the home and is able to maneuver 4 steps to access the home. Patient confirms all demographic information, insurance ([facility] and Medicaid) and PCP - Dr. Ruth Mansfield. Daughters assist patient with all transportation. Pharmacy of choice is [facility] [address] [employer]    FOC was offered for SNF - referral sent to [facility]. [employer] auth to be initiated via [employer].     Care Management Interventions  Support Systems: Child(robin), Other Family Member(s)  Discharge Location  Patient Expects to be Discharged to[de-identified] Skilled nursing facility      Cyndie Layne RN, BSN, [address] [place] [address]  Manager of Case Management  [phone]

## 2023-01-19 NOTE — ED NOTES
Per family and patient, pt was having significant pain in L knee and lowered herself to the floor to rub topical gel on her leg. Pt did not have a syncopal or near syncope episode. Pt has moderate swelling to L knee and L foot/ankle. Per family pt on Monday had an episode of delayed responses, slurred speech, and slow speech.  Per family, this happens when she gets a UTI, however, pt does have a hx of TIA's in past.

## 2023-01-19 NOTE — PROGRESS NOTES
Problem: Self Care Deficits Care Plan (Adult)  Goal: *Acute Goals and Plan of Care (Insert Text)  Description: FUNCTIONAL STATUS PRIOR TO ADMISSION: Patient was modified independent using a single point cane for functional mobility. Patient mod I to min assist for basic ADLs and required assist instrumental ADLs from family. She reported her daughter assists with bathing as needed and will occasionally assist with LB dressing \"depending on the day\". HOME SUPPORT: The patient lived with daughter and granddaughter. Occupational Therapy Goals  Initiated 1/19/2023  1. Patient will perform grooming sitting EOB with supervision/set-up within 7 day(s). 2.  Patient will perform upper body dressing with supervision/set-up within 7 day(s). 3.  Patient will perform lower body dressing using AE PRN with minimal assistance within 7 day(s). 4.  Patient will perform toilet transfers to [facility] with maximal assistance within 7 day(s). 5.  Patient will perform all aspects of toileting with maximal assistance within 7 day(s). 6.  Patient will participate in upper extremity therapeutic exercise/activities with supervision/set-up for 5 minutes within 7 day(s). Outcome: Not Met   OCCUPATIONAL THERAPY EVALUATION  Patient: Aroldo Link (48 y.o. female)  Date: 1/19/2023  Primary Diagnosis: Knee pain, left [M25.562]       Precautions: Fall, Bed Alarm    ASSESSMENT  Based on the objective data described below, the patient presents with decreased independence in self-care and functional mobility secondary to general weakness, impaired balance, LLE edema/pain, mild confusion, and decreased activity tolerance. Patient is functioning below her baseline for ADLs and functional mobility following GLF resulting in increased LLE pain (imaging found no fx).  Overall, she is now completing ADLs with set-up to total assist and functional mobility with mod x2 to total assist. Patient received semisupine in bed and cleared for therapy by nursing. Patient completed supine > sit with mod assist x2 and required assist to manage LLE and significant additional time when coming EOB. While sitting EOB, patient was able to tailor sit with RLE only to attempt donning sock and required max to total assist to don socks this session. She agreed to attempt sit > stand and required max assist x2 to clear hips from EOB. Despite x2 person assist and rolling walker, patient unable to take lateral side steps this session and returned to EOB requiring total assist to scoot hips back into bed. She returned to supine with max assist x2 and repositioned for comfort. Patient completed simple grooming tasks at bed level this session and tolerated well. Patient was left semisupine in bed with all needs met, VSS, and bed alarmed. Patient would continue to benefit from skilled OT services during acute hospital stay. Anticipate patient will need SNF rehab prior to returning home, pending progress. Current Level of Function Impacting Discharge (ADLs/self-care): set-up to total assist for ADLs, mod assist x2 to total assist for functional mobility     Functional Outcome Measure: The patient scored 25/100 on the Barthel Index outcome measure which is indicative of being very dependent in ADLs. Other factors to consider for discharge: fall risk, mild confusion      Patient will benefit from skilled therapy intervention to address the above noted impairments. PLAN :  Recommendations and Planned Interventions: self care training, functional mobility training, therapeutic exercise, balance training, therapeutic activities, endurance activities, patient education, home safety training, and family training/education    Frequency/Duration: Patient will be followed by occupational therapy 4 times a week to address goals.     Recommendation for discharge: (in order for the patient to meet his/her long term goals)  Therapy up to 5 days/week in SNF setting    This discharge recommendation:  Has been made in collaboration with the attending provider and/or case management    IF patient discharges home will need the following DME: TBD in SNF rehab       SUBJECTIVE:   Patient stated I kept asking for socks but they told me they didn't have any.     OBJECTIVE DATA SUMMARY:   HISTORY:   No past medical history on file. Past Surgical History:   Procedure Laterality Date    HX BREAST BIOPSY Right        Expanded or extensive additional review of patient history:     Home Situation  Home Environment: Private residence  # Steps to Enter: 2  Rails to Enter: Yes  Hand Rails : Right  One/Two Story Residence: One story  Living Alone: No  Support Systems: Child(robin), Other Family Member(s)  Patient Expects to be Discharged to[de-identified] Skilled nursing facility  Current DME Used/Available at Home: Cane, straight  Tub or Shower Type: Tub/Shower combination    Hand dominance: Right    EXAMINATION OF PERFORMANCE DEFICITS:  Cognitive/Behavioral Status:  Neurologic State: Alert  Orientation Level: Oriented to situation;Oriented to person;Oriented to place  Cognition: Appropriate for age attention/concentration; Follows commands  Perception: Appears intact  Perseveration: No perseveration noted  Safety/Judgement: Awareness of environment    Edema: LLE edema    Hearing: Auditory  Auditory Impairment: Hard of hearing, bilateral    Vision/Perceptual:     Acuity: Impaired near vision; Impaired far vision    Corrective Lenses: Glasses    Range of Motion:  AROM: Generally decreased, functional  PROM: Generally decreased, functional    Strength:  Strength: Generally decreased, functional    Coordination:  Coordination: Within functional limits  Fine Motor Skills-Upper: Left Intact; Right Intact    Gross Motor Skills-Upper: Left Intact; Right Intact    Tone & Sensation:  Tone: Normal  Sensation:  (not tested)    Balance:  Sitting: Intact  Standing: Impaired  Standing - Static: Fair;Constant support  Standing - Dynamic : Not tested    Functional Mobility and Transfers for ADLs:  Bed Mobility:  Supine to Sit: Moderate assistance;Assist x2  Sit to Supine: Maximum assistance;Assist x2  Scooting: Total assistance    Transfers:  Sit to Stand: Maximum assistance;Assist x2  Stand to Sit: Moderate assistance;Assist x2  Bed to Chair:  (unable)    ADL Assessment:  Feeding: Setup  Oral Facial Hygiene/Grooming: Setup;Supervision (bed level)  Bathing: Maximum assistance  Upper Body Dressing: Contact guard assistance (seated)  Lower Body Dressing: Maximum assistance  Toileting: Total assistance    ADL Intervention and task modifications:    Grooming  Position Performed: Other (comment) (semisupine in bed)  Washing Hands: Set-up  Cues: Verbal cues provided    Lower Body Dressing Assistance  Socks: Maximum assistance (was able to don R sock with mod assist; total to doff both and don L sock)  Leg Crossed Method Used: Yes (RLE only)  Position Performed: Seated edge of bed  Cues: Don;Physical assistance; Tactile cues provided;Verbal cues provided; Doff    Cognitive Retraining  Safety/Judgement: Awareness of environment    Functional Measure:    Barthel Index:  Bathin  Bladder: 5  Bowels: 5  Groomin  Dressin  Feeding: 10  Mobility: 0  Stairs: 0  Toilet Use: 0  Transfer (Bed to Chair and Back): 0  Total: 25/100      The Barthel ADL Index: Guidelines  1. The index should be used as a record of what a patient does, not as a record of what a patient could do. 2. The main aim is to establish degree of independence from any help, physical or verbal, however minor and for whatever reason. 3. The need for supervision renders the patient not independent. 4. A patient's performance should be established using the best available evidence. Asking the patient, friends/relatives and nurses are the usual sources, but direct observation and common sense are also important. However direct testing is not needed.   5. Usually the patient's performance over the preceding 24-48 hours is important, but occasionally longer periods will be relevant. 6. Middle categories imply that the patient supplies over 50 per cent of the effort. 7. Use of aids to be independent is allowed. Score Interpretation (from [address] [facility] 83)    Independent   60-79 Minimally independent   40-59 Partially dependent   20-39 Very dependent   <20 Totally dependent     -Anil Sol., Barthel, D.W. (1965). Functional evaluation: the Barthel Index. [address] ([address] (1997). The Barthel activities of daily living index: self-reporting versus actual performance in the old (> or = 75 years). Journal of [address] [place] [address] 45(7), 14 [facility], ELIANA, Savanna Camara, Saulo Call. (1999). Measuring the change in disability after inpatient rehabilitation; comparison of the responsiveness of the Barthel Index and Functional [place] Measure. Journal of Neurology, Neurosurgery, and Psychiatry, 66(4), 164-582. Panda Sheriff, N.J.SARINA, GM Augustine, & Mahnaz France, MMARIAH. (2004) Assessment of post-stroke quality of life in cost-effectiveness studies: The usefulness of the Barthel Index and the EuroQoL-5D. Quality of Life Research, 13, 231-37      Based on the above components, the patient evaluation is determined to be of the following complexity level: MEDIUM  Pain Rating:  Patient c/o LLE pain. Activity Tolerance:   Fair and SpO2 stable on RA    After treatment patient left in no apparent distress:    Supine in bed, Call bell within reach, Bed / chair alarm activated, and Side rails x 3    COMMUNICATION/EDUCATION:   The patients plan of care was discussed with: Physical therapist and Registered nurse. Home safety education was provided and the patient/caregiver indicated understanding., Patient/family have participated as able in goal setting and plan of care. , and Patient/family agree to work toward stated goals and plan of care. This patients plan of care is appropriate for delegation to JACQUELIN.     Thank you for this referral.  Karen Cole, OTR/L  Time Calculation: 31 mins

## 2023-01-19 NOTE — PROGRESS NOTES
Bedside shift report received from [facility]. Report included the following information SBAR, Kardex, MAR, and Recent Results. This RN verbalized understanding of plan of care with opportunity for clarification and questions. 1604: attempted to call report. Nurse discharging patient at this time. TRANSFER - OUT REPORT:    Verbal report given to (name) on Madison Cooley  being transferred to [facility] (unit) for routine progression of care       Report consisted of patients Situation, Background, Assessment and   Recommendations(SBAR). Information from the following report(s) SBAR, Kardex, MAR, Med Rec Status, and Cardiac Rhythm sinus  was reviewed with the receiving nurse. Lines:   Peripheral IV 01/18/23 Right Antecubital (Active)   Site Assessment Clean, dry, & intact 01/19/23 1448   Phlebitis Assessment 0 01/19/23 1448   Infiltration Assessment 0 01/19/23 1448   Dressing Status Clean, dry, & intact 01/19/23 1448   Dressing Type Tape;Transparent 01/19/23 1448   Hub Color/Line Status Pink;Patent; Infusing 01/19/23 1448   Action Taken Other (comment) 01/19/23 0425        Opportunity for questions and clarification was provided.       Patient transported with:   Monitor

## 2023-01-19 NOTE — PROGRESS NOTES
Problem: Mobility Impaired (Adult and Pediatric)  Goal: *Acute Goals and Plan of Care (Insert Text)  Description: FUNCTIONAL STATUS PRIOR TO ADMISSION: Patient was modified independent using a single point cane for functional mobility. HOME SUPPORT PRIOR TO ADMISSION:The patient lived with daughter and granddaughter and required minimal assistance/contact guard assist for some ADL's. Physical Therapy Goals  Initiated 1/19/2023  1. Patient will move from supine to sit and sit to supine  in bed with minimal assistance/contact guard assist within 7 day(s). 2.  Patient will transfer from bed to chair and chair to bed with minimal assistance/contact guard assist using the least restrictive device within 7 day(s). 3.  Patient will perform sit to stand with minimal assistance/contact guard assist within 7 day(s). 4.  Patient will ambulate with minimal assistance/contact guard assist for 20 feet with the least restrictive device within 7 day(s). Outcome: Not Met  Note:   PHYSICAL THERAPY EVALUATION  Patient: Cathy Bernstein (2 y.o. female)  Date: 1/19/2023  Primary Diagnosis: Knee pain, left [M25.562]       Precautions:   Fall, Bed Alarm    ASSESSMENT  Based on the objective data described below, the patient presents with generalized weakness, impaired balance, decreased activity tolerance, and declining functional mobility skills. Patient supine upon arrival; agreeable to mobility. Patient requires mod assist x 2 to come to EOB with good sitting balance. Sit to stand x 2 with max assist x 2 but unable to come to full stand on first attempt. Able to stand second attempt but with little weightbearing left LE due to pain in knee. Unable to take any steps despite assistance. Assisted back to bed with max assist x 2. Recommend SNF at discharge. Current Level of Function Impacting Discharge (mobility/balance): mod/max assist x 2    Functional Outcome Measure:   The patient scored 25/100 on the Barthel Index outcome measure which is indicative of 75% decline in mobility and ADL's. Other factors to consider for discharge: knee pain     Patient will benefit from skilled therapy intervention to address the above noted impairments. PLAN :  Recommendations and Planned Interventions: bed mobility training, transfer training, gait training, therapeutic exercises, patient and family training/education, and therapeutic activities      Frequency/Duration: Patient will be followed by physical therapy:  4 times a week to address goals. Recommendation for discharge: (in order for the patient to meet his/her long term goals)  Therapy up to 5 days/week in SNF setting    This discharge recommendation:  Has not yet been discussed the attending provider and/or case management    IF patient discharges home will need the following DME: to be determined (TBD)         SUBJECTIVE:   Patient stated I'll try.     OBJECTIVE DATA SUMMARY:   HISTORY:    No past medical history on file. Past Surgical History:   Procedure Laterality Date    HX BREAST BIOPSY Right        Personal factors and/or comorbidities impacting plan of care: pain control    Home Situation  Home Environment: Private residence  # Steps to Enter: 2  Rails to Enter: Yes  Hand Rails : Right  One/Two Story Residence: One story  Living Alone: No  Support Systems: Child(robin), Other Family Member(s)  Patient Expects to be Discharged to[de-identified] Skilled nursing facility  Current DME Used/Available at Home: Cane, straight  Tub or Shower Type: Tub/Shower combination    EXAMINATION/PRESENTATION/DECISION MAKING:   Critical Behavior:  Neurologic State: Alert  Orientation Level: Oriented to situation, Oriented to person, Oriented to place  Cognition: Appropriate for age attention/concentration, Follows commands  Safety/Judgement: Awareness of environment  Hearing:   Auditory  Auditory Impairment: Hard of hearing, bilateral  Skin:  intact  Edema: left ankle and knee  Range Of Motion:  AROM: Generally decreased, functional           PROM: Generally decreased, functional           Strength:    Strength: Generally decreased, functional                    Tone & Sensation:   Tone: Normal              Sensation:  (not tested)               Coordination:  Coordination: Within functional limits  Vision:   Acuity: Impaired near vision; Impaired far vision  Corrective Lenses: Glasses  Functional Mobility:  Bed Mobility:     Supine to Sit: Moderate assistance;Assist x2  Sit to Supine: Maximum assistance;Assist x2  Scooting: Total assistance  Transfers:  Sit to Stand: Maximum assistance;Assist x2  Stand to Sit: Moderate assistance;Assist x2        Bed to Chair:  (unable)              Balance:   Sitting: Intact  Standing: Impaired  Standing - Static: Fair;Constant support  Standing - Dynamic : Not tested  Ambulation/Gait Training:                    Right Side Weight Bearing: Full                                                      Functional Measure:  Barthel Index:    Bathin  Bladder: 5  Bowels: 5  Groomin  Dressin  Feeding: 10  Mobility: 0  Stairs: 0  Toilet Use: 0  Transfer (Bed to Chair and Back): 0  Total: 25/100       The Barthel ADL Index: Guidelines  1. The index should be used as a record of what a patient does, not as a record of what a patient could do. 2. The main aim is to establish degree of independence from any help, physical or verbal, however minor and for whatever reason. 3. The need for supervision renders the patient not independent. 4. A patient's performance should be established using the best available evidence. Asking the patient, friends/relatives and nurses are the usual sources, but direct observation and common sense are also important. However direct testing is not needed. 5. Usually the patient's performance over the preceding 24-48 hours is important, but occasionally longer periods will be relevant.   6. Middle categories imply that the patient supplies over 50 per cent of the effort. 7. Use of aids to be independent is allowed. Score Interpretation (from [address] [facility] 83)    Independent   60-79 Minimally independent   40-59 Partially dependent   20-39 Very dependent   <20 Totally dependent     -Buddy Sol, Barthel, D.W. (1965). Functional evaluation: the Barthel Index. [address] ([address] (). The Barthel activities of daily living index: self-reporting versus actual performance in the old (> or = 75 years). Journal of [address] [place] [address] 45(7), 14 [facility], J..M.F, Elizabet Kevin., Bethanie Merino. (1999). Measuring the change in disability after inpatient rehabilitation; comparison of the responsiveness of the Barthel Index and Functional [place] Measure. Journal of Neurology, Neurosurgery, and Psychiatry, 66(4), 287-587. Rufino Cadet, N.J.A, Aracely He  W.J.LOLIS, & Brennen Sims, M.A. (2004) Assessment of post-stroke quality of life in cost-effectiveness studies: The usefulness of the Barthel Index and the EuroQoL-5D.  Quality of Life Research, 15, 551-95            Physical Therapy Evaluation Charge Determination   History Examination Presentation Decision-Making   MEDIUM  Complexity : 1-2 comorbidities / personal factors will impact the outcome/ POC  MEDIUM Complexity : 3 Standardized tests and measures addressing body structure, function, activity limitation and / or participation in recreation  LOW Complexity : Stable, uncomplicated  LOW Complexity : FOTO score of       Based on the above components, the patient evaluation is determined to be of the following complexity level: LOW     Pain Ratin/10    Activity Tolerance:   Fair    After treatment patient left in no apparent distress:   Supine in bed, Heels elevated for pressure relief, Call bell within reach, Bed / chair alarm activated, and Side rails x 3    COMMUNICATION/EDUCATION:   The patients plan of care was discussed with: Occupational therapist and Registered nurse. Fall prevention education was provided and the patient/caregiver indicated understanding. and Patient/family agree to work toward stated goals and plan of care.     Thank you for this referral.  Lorie Hartman, PT   Time Calculation: 32 mins

## 2023-01-19 NOTE — H&P
This note is compiled to communicate with the medical care team. It may contain sensitive and protected information. It is not intended to serve as a source of communication with patients/families/friends; that communication occurs at the bedside or via alternative direct communications means (secure messaging, letters, video/telephone, etc.). Hospitalist Admission Note    NAME: Vasile Alonso   :  1940   MRN:  [id number]     Date/Time:  2023 1:41 AM    Patient PCP: Miranda Shaikh MD  ______________________________________________________________________  Given the patient's current clinical presentation, I have a high level of concern for decompensation if discharged from the emergency department. Complex decision making was performed, which includes reviewing the patient's available past medical records, laboratory results, and x-ray films. My assessment of this patient's clinical condition and my plan of care is as follows. Subjective:   CHIEF COMPLAINT: Knee pain/slowed mentation    HISTORY OF PRESENT ILLNESS:     Vasile Alonso is a 80 y.o.  female with pertinent past medical history of diabetes mellitus, seasonal allergies, GERD, MDD/ALAYNA, hyperlipidemia, essential hypertension, osteoarthritis who presents via EMS after inadvertent slip from chair to the ground with exacerbation of chronic left knee pain. Patient reports she was applying Voltaren gel to her knee when she slipped from the chair to the ground and was unable to rise and was on the ground until her granddaughter returned home and multiple family members were required to assist her to a chair. After fall patient unable to ambulate secondary to pain in left leg prompting call for EMS transport to the ED for evaluation. Additionally, daughter supplies that patient has had slowed mentation for the past 2 days of unclear etiology. Family also reports patient has been experiencing daytime somnolence.   ROS otherwise negative. In the ED, patient afebrile and hemodynamically stable (hypertensive 180s/80s) saturating mid 90s on room air. ECG demonstrates sinus rhythm with frequent PACs. CXR demonstrates no acute process. CT head demonstrates no acute process. Left leg radiographs demonstrate no fracture and notable for left knee effusion with advanced osteoarthritis. Left lower extremity venous Doppler demonstrates no evidence of DVT. UA reflex to culture (protein, ketones, leukocyte Estrace, 4+ bacteria). Labs demonstrate: WBC 11.3, hemoglobin 11.5, platelets 533, high sensory troponin 51 and 55, proBNP 741, sodium 141, potassium 3.7, glucose 266, BUN 36, creatinine 1.31 (baseline 1.1). We were asked to admit for work up and evaluation of the above problems. No past medical history on file. Past Surgical History:   Procedure Laterality Date    HX BREAST BIOPSY Right        Social History     Tobacco Use    Smoking status: Not on file    Smokeless tobacco: Not on file   Substance Use Topics    Alcohol use: Not on file        Family History   Problem Relation Age of Onset    Breast Cancer Sister        Allergies   Allergen Reactions    Latex Swelling    Ace Inhibitors Angioedema    Iodine Angioedema    Tramadol Nausea and Vomiting        Prior to Admission medications    Medication Sig Start Date End Date Taking? Authorizing Provider   aspirin delayed-release 81 mg tablet Take 81 mg by mouth daily. Yes Aundrea, MD Ryan   sertraline (ZOLOFT) 50 mg tablet Take 50 mg by mouth daily. Yes Aundrea, MD Ryan   montelukast (SINGULAIR) 10 mg tablet Take 10 mg by mouth nightly. Yes Other, MD Ryan   magnesium oxide (MAG-OX) 400 mg tablet Take 400 mg by mouth daily. Yes Aundrea, MD Ryan   atorvastatin (LIPITOR) 80 mg tablet Take 80 mg by mouth daily. Yes Aundrea, MD Ryan   esomeprazole (NEXIUM) 40 mg capsule Take 40 mg by mouth daily. Yes Ryan Guillaume MD   losartan (COZAAR) 100 mg tablet Take 100 mg by mouth daily. Yes Other, MD Ryan   traZODone (DESYREL) 50 mg tablet Take 50 mg by mouth two (2) times a day. Yes Aundrea, MD Ryan   metoprolol tartrate (LOPRESSOR) 50 mg tablet Take 50 mg by mouth two (2) times a day. Yes Aundrea, MD Ryan   metFORMIN (GLUCOPHAGE) 500 mg tablet Take 500 mg by mouth two (2) times daily (with meals). Yes Aundrea, MD Ryan   glipiZIDE (GLUCOTROL) 10 mg tablet Take 10 mg by mouth two (2) times a day. Yes Other, MD Ryan   ergocalciferol (ERGOCALCIFEROL) 1,250 mcg (50,000 unit) capsule Take 50,000 Units by mouth every Sunday. Yes Other, MD Ryan   meloxicam (MOBIC) 15 mg tablet Take 15 mg by mouth daily. Provider, Historical       REVIEW OF SYSTEMS:       Total of 12 systems reviewed with pertinent positives and negatives noted in HPI      Objective:   VITALS:    Visit Vitals  BP (!) 181/88   Pulse 92   Temp 98.8 °F (37.1 °C)   Resp (!) 32   Wt 89.4 kg (197 lb)   SpO2 95%   BMI 39.79 kg/m²       PHYSICAL EXAM:    General:   Alert, cooperative, no distress, elderly -American female        HEENT:  Atraumatic, anicteric sclerae, pink conjunctivae, mucosa moist, dentition fair     Neck:  Supple, symmetrical, trachea midline, no abnormalities on palpation     Lungs:   CTAB. Symmetric expansion. Good aeration. Normal respiratory effort. Chest wall:  No tenderness. No Accessory muscle use. Cardiovascular:   RRR, No murmur/rub/gallop. No JVD. Radial/AT/DP pulse 2+     GI/:   Soft, non-tender. Not distended. Bowel sounds normal. No HSM     Extremities No edema. No cyanosis. Capillary refill normal, right knee status post TKA, left knee with palpable effusion and patellar ballottement, tender to palpation along joint line, intolerant of flexion greater than 10 degrees, no ligamentous instability appreciated     Skin: No significant lesions noted. Not Jaundiced. No rashes      Neurologic: PERRL. EOMI. No facial asymmetry. No aphasia or slurred speech. Moves all extremities. Sensation to light touch grossly intact BUE/BLE. No overt focal deficits appreciated     Psych:  Alert and oriented X 4. Normal affect. Good insight     Other:   N/A         LAB DATA REVIEWED:    Recent Results (from the past 24 hour(s))   EKG, 12 LEAD, INITIAL    Collection Time: 01/18/23  6:35 PM   Result Value Ref Range    Ventricular Rate 108 BPM    Atrial Rate 108 BPM    P-R Interval 180 ms    QRS Duration 80 ms    Q-T Interval 356 ms    QTC Calculation (Bezet) 477 ms    Calculated P Axis 34 degrees    Calculated R Axis -8 degrees    Calculated T Axis 36 degrees    Diagnosis       Sinus tachycardia with premature supraventricular complexes  No previous ECGs available     CBC WITH AUTOMATED DIFF    Collection Time: 01/18/23  6:42 PM   Result Value Ref Range    WBC 11.3 (H) 3.6 - 11.0 K/uL    RBC 3.87 3.80 - 5.20 M/uL    HGB 11.5 11.5 - 16.0 g/dL    HCT 35.3 35.0 - 47.0 %    MCV 91.2 80.0 - 99.0 FL    MCH 29.7 26.0 - 34.0 PG    MCHC 32.6 30.0 - 36.5 g/dL    RDW 14.2 11.5 - 14.5 %    PLATELET 533 577 - 813 K/uL    MPV 9.3 8.9 - 12.9 FL    NRBC 0.0 0  WBC    ABSOLUTE NRBC 0.00 0.00 - 0.01 K/uL    NEUTROPHILS 79 (H) 32 - 75 %    LYMPHOCYTES 10 (L) 12 - 49 %    MONOCYTES 10 5 - 13 %    EOSINOPHILS 0 0 - 7 %    BASOPHILS 0 0 - 1 %    IMMATURE GRANULOCYTES 1 (H) 0.0 - 0.5 %    ABS. NEUTROPHILS 9.0 (H) 1.8 - 8.0 K/UL    ABS. LYMPHOCYTES 1.2 0.8 - 3.5 K/UL    ABS. MONOCYTES 1.1 (H) 0.0 - 1.0 K/UL    ABS. EOSINOPHILS 0.0 0.0 - 0.4 K/UL    ABS. BASOPHILS 0.0 0.0 - 0.1 K/UL    ABS. IMM.  GRANS. 0.1 (H) 0.00 - 0.04 K/UL    DF AUTOMATED     METABOLIC PANEL, COMPREHENSIVE    Collection Time: 01/18/23  6:42 PM   Result Value Ref Range    Sodium 141 136 - 145 mmol/L    Potassium 3.7 3.5 - 5.1 mmol/L    Chloride 110 (H) 97 - 108 mmol/L    CO2 22 21 - 32 mmol/L    Anion gap 9 5 - 15 mmol/L    Glucose 266 (H) 65 - 100 mg/dL    BUN 36 (H) 6 - 20 MG/DL    Creatinine 1.31 (H) 0.55 - 1.02 MG/DL    BUN/Creatinine ratio 27 (H) 12 - 20      eGFR 41 (L) >60 ml/min/1.73m2    Calcium 9.9 8.5 - 10.1 MG/DL    Bilirubin, total 0.6 0.2 - 1.0 MG/DL    ALT (SGPT) 20 12 - 78 U/L    AST (SGOT) 41 (H) 15 - 37 U/L    Alk.  phosphatase 92 45 - 117 U/L    Protein, total 8.3 (H) 6.4 - 8.2 g/dL    Albumin 3.0 (L) 3.5 - 5.0 g/dL    Globulin 5.3 (H) 2.0 - 4.0 g/dL    A-G Ratio 0.6 (L) 1.1 - 2.2     NT-PRO BNP    Collection Time: 01/18/23  6:42 PM   Result Value Ref Range    NT pro- (H) <450 PG/ML   TROPONIN-HIGH SENSITIVITY    Collection Time: 01/18/23  6:42 PM   Result Value Ref Range    Troponin-High Sensitivity 51 0 - 51 ng/L   URINALYSIS W/ REFLEX CULTURE    Collection Time: 01/18/23  9:17 PM    Specimen: Urine   Result Value Ref Range    Color YELLOW/STRAW      Appearance CLOUDY (A) CLEAR      Specific gravity 1.026      pH (UA) 5.0 5.0 - 8.0      Protein >300 (A) NEG mg/dL    Glucose Negative NEG mg/dL    Ketone TRACE (A) NEG mg/dL    Bilirubin Negative NEG      Blood LARGE (A) NEG      Urobilinogen 0.2 0.2 - 1.0 EU/dL    Nitrites Negative NEG      Leukocyte Esterase SMALL (A) NEG      WBC 20-50 0 - 4 /hpf    RBC 0-5 0 - 5 /hpf    Epithelial cells FEW FEW /lpf    Bacteria 4+ (A) NEG /hpf    UA:UC IF INDICATED URINE CULTURE ORDERED (A) CNI     TROPONIN-HIGH SENSITIVITY    Collection Time: 01/18/23  9:58 PM   Result Value Ref Range    Troponin-High Sensitivity 55 (H) 0 - 51 ng/L   EKG, 12 LEAD, INITIAL    Collection Time: 01/19/23  1:02 AM   Result Value Ref Range    Ventricular Rate 96 BPM    Atrial Rate 96 BPM    P-R Interval 188 ms    QRS Duration 88 ms    Q-T Interval 370 ms    QTC Calculation (Bezet) 467 ms    Calculated P Axis 66 degrees    Calculated R Axis -3 degrees    Calculated T Axis 23 degrees    Diagnosis       Sinus rhythm with premature atrial complexes  When compared with ECG of 18-JAN-2023 18:35,  MANUAL COMPARISON REQUIRED, DATA IS UNCONFIRMED         IMAGING:  Left lower extremity venous duplex-no evidence of DVT    CXR-clear lungs with tortuous aorta    CT head-no intracranial hemorrhage. Age-indeterminate microvascular ischemic disease. Age-indeterminant sphenoid sinusitis. Left hip radiographs-limited study. No visible fracture. Left knee radiographs-no fracture. Effusion. Advanced osteoarthritis.     EKG: Sinus rhythm with PACs, rate 96, , QTc 467  ______________________________________________________________________    Assessment / Plan:  Left knee osteoarthritis exacerbated by a mechanical fall  UTI  Elevated creatinine not meeting criteria for MAGDA-likely secondary to dehydration  MDD/ALAYNA/insomnia  GERD  Non-insulin-dependent diabetes mellitus  Essential hypertension    PLAN:    Left knee osteoarthritis exacerbated by a mechanical fall  Admit to medical observation  Orthopedics consulted, greatly appreciate their expertise  -May benefit from intra-articular steroid injection if deemed appropriate  -No erythema to suggest septic joint or crystalline arthropathy  I do not appreciate any ligamentous instability on exam, but could consider MRI versus CT to further evaluate soft tissue  Trial Tylenol, topical Voltaren, oxycodone for pain control  Patient directed to attempt to mobilize knee gently to prevent excess stiffness  PT/OT consulted    UTI  Plan for 5-day course ceftriaxone  Follow urine culture and adjust antibiotics accordingly    Elevated creatinine not meeting criteria for MAGDA-likely secondary to dehydration  Will give 50 cc/h x 10 hours normal saline    MDD/ALAYNA/insomnia  Continue PTA sertraline  Change PTA trazodone from twice daily to nightly-unclear why it was dose twice daily, but is likely contributing to excessive daytime somnolence  -Family and patient deny history of psychiatric disorder outside of depression    GERD  Protonix daily    Non-insulin-dependent diabetes mellitus  Sliding scale corrective coverage insulin    Essential hypertension  Continue PTA aspirin, atorvastatin, metoprolol    Code Status: Full    DVT Prophylaxis: Lovenox  GI Prophylaxis: Protonix  Diet: Cardiac/diabetic       Care Plan discussed with:    Comments   Patient x    Family  x Present at bedside   RN     Care Manager                    Consultant:      _______________________________________________________________________  Baseline Level of Function: Independent    Expected  Disposition:   Home with Family    HH/PT/OT/RN x   SNF/LTC    ROSARIO    ________________________________________________________________________  TOTAL TIME:  75 Minutes   MEDICAL COMPLEXITY: high  TOBACCO CESSATION COUNSELING: NO        Comments    x Reviewed previous records   >50% of visit spent in counseling and coordination of care x Discussion with patient and/or family and questions answered       ________________________________________________________________________  Signed: Steven Caraballo DO  1/19/2023 1:41 AM    Please note that this documentation was completed in part with Dragon dictation software. Occasionally unanticipated grammatical, syntax, homophones, and other interpretive errors are inadvertently transcribed by the computer software. Please excuse and disregard any errors that have escaped final proofreading. If in doubt, please do not hesitate to reach out to myself or the assigned hospitalist via [facility] paging system for clarification.

## 2023-01-19 NOTE — ED NOTES
Patient resting on stretcher with eyes closed at this time. No distress observed, respirations are present and unlabored. Side rails x2 in upright position and call light within reach.

## 2023-01-19 NOTE — PROGRESS NOTES
Pt admitted early this AM.  Chart reviewed and pt examined. Agreed with plan set forth by DR Brooks. Will obtain AM labs and add prn IV hydralazine. Will restart lower dose of glipizide, titrate as needed. Updated pt's daughter at beside. Discussed with RN.

## 2023-01-20 LAB
ANION GAP SERPL CALC-SCNC: 6 MMOL/L (ref 5–15)
BASOPHILS # BLD: 0 K/UL (ref 0–0.1)
BASOPHILS NFR BLD: 0 % (ref 0–1)
BUN SERPL-MCNC: 39 MG/DL (ref 6–20)
BUN/CREAT SERPL: 41 (ref 12–20)
CALCIUM SERPL-MCNC: 8.6 MG/DL (ref 8.5–10.1)
CHLORIDE SERPL-SCNC: 108 MMOL/L (ref 97–108)
CO2 SERPL-SCNC: 24 MMOL/L (ref 21–32)
CREAT SERPL-MCNC: 0.94 MG/DL (ref 0.55–1.02)
DIFFERENTIAL METHOD BLD: ABNORMAL
EOSINOPHIL # BLD: 0.4 K/UL (ref 0–0.4)
EOSINOPHIL NFR BLD: 4 % (ref 0–7)
ERYTHROCYTE [DISTWIDTH] IN BLOOD BY AUTOMATED COUNT: 14.2 % (ref 11.5–14.5)
GLUCOSE BLD STRIP.AUTO-MCNC: 123 MG/DL (ref 65–117)
GLUCOSE BLD STRIP.AUTO-MCNC: 163 MG/DL (ref 65–117)
GLUCOSE BLD STRIP.AUTO-MCNC: 171 MG/DL (ref 65–117)
GLUCOSE BLD STRIP.AUTO-MCNC: 206 MG/DL (ref 65–117)
GLUCOSE SERPL-MCNC: 142 MG/DL (ref 65–100)
HCT VFR BLD AUTO: 30.8 % (ref 35–47)
HGB BLD-MCNC: 9.7 G/DL (ref 11.5–16)
IMM GRANULOCYTES # BLD AUTO: 0 K/UL (ref 0–0.04)
IMM GRANULOCYTES NFR BLD AUTO: 0 % (ref 0–0.5)
LYMPHOCYTES # BLD: 2.6 K/UL (ref 0.8–3.5)
LYMPHOCYTES NFR BLD: 27 % (ref 12–49)
MCH RBC QN AUTO: 29.2 PG (ref 26–34)
MCHC RBC AUTO-ENTMCNC: 31.5 G/DL (ref 30–36.5)
MCV RBC AUTO: 92.8 FL (ref 80–99)
MONOCYTES # BLD: 0.7 K/UL (ref 0–1)
MONOCYTES NFR BLD: 7 % (ref 5–13)
NEUTS SEG # BLD: 5.7 K/UL (ref 1.8–8)
NEUTS SEG NFR BLD: 62 % (ref 32–75)
NRBC # BLD: 0 K/UL (ref 0–0.01)
NRBC BLD-RTO: 0 PER 100 WBC
PLATELET # BLD AUTO: 267 K/UL (ref 150–400)
PMV BLD AUTO: 9.7 FL (ref 8.9–12.9)
POTASSIUM SERPL-SCNC: 4.4 MMOL/L (ref 3.5–5.1)
RBC # BLD AUTO: 3.32 M/UL (ref 3.8–5.2)
SERVICE CMNT-IMP: ABNORMAL
SODIUM SERPL-SCNC: 138 MMOL/L (ref 136–145)
WBC # BLD AUTO: 9.5 K/UL (ref 3.6–11)

## 2023-01-20 PROCEDURE — 97110 THERAPEUTIC EXERCISES: CPT

## 2023-01-20 PROCEDURE — 97530 THERAPEUTIC ACTIVITIES: CPT

## 2023-01-20 PROCEDURE — 96372 THER/PROPH/DIAG INJ SC/IM: CPT

## 2023-01-20 PROCEDURE — 36415 COLL VENOUS BLD VENIPUNCTURE: CPT

## 2023-01-20 PROCEDURE — 74011000250 HC RX REV CODE- 250: Performed by: STUDENT IN AN ORGANIZED HEALTH CARE EDUCATION/TRAINING PROGRAM

## 2023-01-20 PROCEDURE — 82962 GLUCOSE BLOOD TEST: CPT

## 2023-01-20 PROCEDURE — 74011250637 HC RX REV CODE- 250/637: Performed by: INTERNAL MEDICINE

## 2023-01-20 PROCEDURE — 65270000029 HC RM PRIVATE

## 2023-01-20 PROCEDURE — 74011250636 HC RX REV CODE- 250/636: Performed by: STUDENT IN AN ORGANIZED HEALTH CARE EDUCATION/TRAINING PROGRAM

## 2023-01-20 PROCEDURE — G0378 HOSPITAL OBSERVATION PER HR: HCPCS

## 2023-01-20 PROCEDURE — 80048 BASIC METABOLIC PNL TOTAL CA: CPT

## 2023-01-20 PROCEDURE — 74011250637 HC RX REV CODE- 250/637: Performed by: STUDENT IN AN ORGANIZED HEALTH CARE EDUCATION/TRAINING PROGRAM

## 2023-01-20 PROCEDURE — 85025 COMPLETE CBC W/AUTO DIFF WBC: CPT

## 2023-01-20 PROCEDURE — 94760 N-INVAS EAR/PLS OXIMETRY 1: CPT

## 2023-01-20 PROCEDURE — 74011636637 HC RX REV CODE- 636/637: Performed by: STUDENT IN AN ORGANIZED HEALTH CARE EDUCATION/TRAINING PROGRAM

## 2023-01-20 RX ORDER — LATANOPROST 50 UG/ML
1 SOLUTION/ DROPS OPHTHALMIC
COMMUNITY

## 2023-01-20 RX ORDER — TIMOLOL MALEATE 5 MG/ML
1 SOLUTION/ DROPS OPHTHALMIC DAILY
Status: DISCONTINUED | OUTPATIENT
Start: 2023-01-21 | End: 2023-01-26 | Stop reason: HOSPADM

## 2023-01-20 RX ORDER — TIMOLOL MALEATE 5 MG/ML
1 SOLUTION/ DROPS OPHTHALMIC DAILY
COMMUNITY

## 2023-01-20 RX ORDER — LATANOPROST 50 UG/ML
1 SOLUTION/ DROPS OPHTHALMIC
Status: DISCONTINUED | OUTPATIENT
Start: 2023-01-20 | End: 2023-01-26 | Stop reason: HOSPADM

## 2023-01-20 RX ADMIN — GLIPIZIDE 5 MG: 5 TABLET ORAL at 19:06

## 2023-01-20 RX ADMIN — ATORVASTATIN CALCIUM 80 MG: 40 TABLET, FILM COATED ORAL at 10:24

## 2023-01-20 RX ADMIN — ASPIRIN 81 MG: 81 TABLET, COATED ORAL at 10:15

## 2023-01-20 RX ADMIN — PANTOPRAZOLE SODIUM 40 MG: 40 TABLET, DELAYED RELEASE ORAL at 10:15

## 2023-01-20 RX ADMIN — OXYCODONE HYDROCHLORIDE 5 MG: 5 TABLET ORAL at 22:38

## 2023-01-20 RX ADMIN — DICLOFENAC SODIUM 4 G: 10 GEL TOPICAL at 19:15

## 2023-01-20 RX ADMIN — METOPROLOL TARTRATE 50 MG: 50 TABLET, FILM COATED ORAL at 10:15

## 2023-01-20 RX ADMIN — OXYCODONE HYDROCHLORIDE 5 MG: 5 TABLET ORAL at 09:58

## 2023-01-20 RX ADMIN — Medication 2 UNITS: at 11:30

## 2023-01-20 RX ADMIN — GLIPIZIDE 5 MG: 5 TABLET ORAL at 10:15

## 2023-01-20 RX ADMIN — SODIUM CHLORIDE, PRESERVATIVE FREE 10 ML: 5 INJECTION INTRAVENOUS at 05:38

## 2023-01-20 RX ADMIN — Medication 2 UNITS: at 19:06

## 2023-01-20 RX ADMIN — OXYCODONE HYDROCHLORIDE 5 MG: 5 TABLET ORAL at 14:35

## 2023-01-20 RX ADMIN — OXYCODONE HYDROCHLORIDE 5 MG: 5 TABLET ORAL at 02:07

## 2023-01-20 RX ADMIN — ENOXAPARIN SODIUM 40 MG: 100 INJECTION SUBCUTANEOUS at 10:16

## 2023-01-20 RX ADMIN — METOPROLOL TARTRATE 50 MG: 50 TABLET, FILM COATED ORAL at 19:06

## 2023-01-20 RX ADMIN — ACETAMINOPHEN 650 MG: 325 TABLET ORAL at 12:49

## 2023-01-20 RX ADMIN — DICLOFENAC SODIUM 4 G: 10 GEL TOPICAL at 22:43

## 2023-01-20 RX ADMIN — MONTELUKAST 10 MG: 10 TABLET, FILM COATED ORAL at 22:38

## 2023-01-20 RX ADMIN — Medication 2 UNITS: at 22:38

## 2023-01-20 RX ADMIN — SODIUM CHLORIDE, PRESERVATIVE FREE 10 ML: 5 INJECTION INTRAVENOUS at 17:37

## 2023-01-20 RX ADMIN — SERTRALINE 50 MG: 50 TABLET, FILM COATED ORAL at 10:15

## 2023-01-20 RX ADMIN — LATANOPROST 1 DROP: 50 SOLUTION OPHTHALMIC at 22:40

## 2023-01-20 NOTE — DISCHARGE INSTRUCTIONS
All of your medications from before your hospitalization are the same EXCEPT:  Your new prescription for you to start is voltaren gel and oxycodone for knee pain. Please take all of your medications as prescribed. If prescribed any medications, please read all pharmacy instructions and inserts. Inform your doctor and pharmacist about all other medications and alternative therapies. Please follow up with your PCP in 1-2 weeks to be reassessed after your hospital stay. Please also follow up with orthopedics to discuss treatment for your left knee. [phone]  If you start feeling any symptoms similar to what brought you into the hospital, please come back to the ED to be re-evaluated.

## 2023-01-20 NOTE — PROGRESS NOTES
Hospitalist Progress Note    NAME: Beverly Alvarez   :  1940   MRN:  [id number]            Subjective:     Chief Complaint / Reason for Physician Visit  Discussed with RN events overnight. Pt reports she is feeling slightly better this morning. Doing well with PRNs. Okay with SNF plan and OP follow up with ortho. Review of Systems:  Symptom Y/N Comments  Symptom Y/N Comments   Fever/Chills    Chest Pain     Poor Appetite    Edema     Cough    Abdominal Pain     Sputum    Joint Pain     SOB/DANIEL    Pruritis/Rash     Nausea/vomit    Tolerating PT/OT     Diarrhea    Tolerating Diet     Constipation    Other       Could NOT obtain due to:      Objective:     VITALS:   Last 24hrs VS reviewed since prior progress note. Most recent are:  Patient Vitals for the past 24 hrs:   Temp Pulse Resp BP SpO2   23 0847 98.4 °F (36.9 °C) 71 16 (!) 150/71 99 %   23 1921 98.1 °F (36.7 °C) 78 18 (!) 147/70 96 %   23 1619 99 °F (37.2 °C) 73 18 (!) 168/73 98 %     No intake or output data in the 24 hours ending 23 1515     PHYSICAL EXAM:  General: WD, WN. Alert, cooperative, no acute distress    EENT:  EOMI. Anicteric sclerae. MMM  Resp:  CTA bilaterally, no wheezing or rales. No accessory muscle use  CV:  Regular  rhythm,  No edema  GI:  Soft, Non distended, Non tender. +Bowel sounds  Neurologic:  Alert and oriented X 3, normal speech,   Psych:   Good insight. Not anxious nor agitated  Skin:  No rashes. No jaundice. Right total knee replacement scar. Ext:  Edema and effusion superolateral left knee. Procedures: see electronic medical records for all procedures/Xrays and details which were not copied into this note but were reviewed prior to creation of Plan. LABS:  I reviewed today's most current labs and imaging studies.   Pertinent labs include:  Recent Labs     23  1842   WBC 9.5 11.3*   HGB 9.7* 11.5   HCT 30.8* 35.3    299     Recent Labs     23 01/19/23  0923 01/18/23  1842    141 141   K 4.4 4.0 3.7    111* 110*   CO2 24 22 22   * 251* 266*   BUN 39* 38* 36*   CREA 0.94 1.13* 1.31*   CA 8.6 9.3 9.9   ALB  --   --  3.0*   TBILI  --   --  0.6   ALT  --   --  20       Signed: Antonietta Fay MD        Reviewed most current lab test results and cultures  YES  Reviewed most current radiology test results   YES  Review and summation of old records today    NO  Reviewed patient's current orders and MAR    YES  PMH/SH reviewed - no change compared to H&P      Assessment / Plan:  Left knee osteoarthritis exacerbated by a mechanical fall  Orthopedics consulted, greatly appreciate their expertise  -May benefit from intra-articular steroid injection if deemed appropriate  -No erythema to suggest septic joint or crystalline arthropathy  CT with severe tricompartmental OA  Trial Tylenol, topical Voltaren, oxycodone for pain control  Patient directed to attempt to mobilize knee gently to prevent excess stiffness  PT/OT consulted    UTI  Plan for 3-day course ceftriaxone  Follow urine culture and adjust antibiotics accordingly     Elevated creatinine not meeting criteria for MAGDA-likely secondary to dehydration  Resolved     MDD/ALAYNA/insomnia  Continue PTA sertraline  Change PTA trazodone from twice daily to nightly-unclear why it was dose twice daily, but is likely contributing to excessive daytime somnolence  -Family and patient deny history of psychiatric disorder outside of depression     GERD  Protonix daily     Non-insulin-dependent diabetes mellitus  Sliding scale corrective coverage insulin     Essential hypertension  Continue PTA aspirin, atorvastatin, metoprolol    30.0 - 39.9 Obese / Body mass index is 39.79 kg/m².     Code status: Full  Prophylaxis: Lovenox  Recommended Disposition: SNF/LTC  DAWSON:  Barriers:     ________________________________________________________________________  Care Plan discussed with:    Comments   Patient x Family  x Daughter  Blaine Valdez updated 1/20 at bedside   RN x    Care Manager x    Consultant                        Multidiciplinary team rounds were held today with , nursing, pharmacist and clinical coordinator. Patient's plan of care was discussed; medications were reviewed and discharge planning was addressed.      ________________________________________________________________________  Total NON critical care TIME:  35   Minutes    Total CRITICAL CARE TIME Spent:   Minutes non procedure based      Comments   >50% of visit spent in counseling and coordination of care     ________________________________________________________________________  Sergey Rachel MD

## 2023-01-20 NOTE — PROGRESS NOTES
Knee pain and swelling  Ct shows severe djd    Patient Vitals for the past 24 hrs:   Temp Pulse Resp BP SpO2   01/20/23 0847 98.4 °F (36.9 °C) 71 16 (!) 150/71 99 %   01/19/23 1921 98.1 °F (36.7 °C) 78 18 (!) 147/70 96 %   01/19/23 1619 99 °F (37.2 °C) 73 18 (!) 168/73 98 %     Knee TTP with mild effusion  Imaging :  Study Result    Narrative & Impression   INDICATION:  , r/o occult fracture      EXAM: CT left knee. Comparison prior day. Thin section axial images were obtained. From these sagittal and coronal  reformats were performed. CT dose reduction was achieved through use of a  standardized protocol tailored for this examination and automatic exposure  control for dose modulation. FINDINGS: There is a large joint effusion and moderate-sized Baker's cyst. Bones  are osteopenic. Extensive tricompartmental degenerative change there is  remodeling of the posterior lateral tibial plateau. No acute fracture. Chondrocalcinosis of the medial meniscus. There are vascular calcifications     IMPRESSION  1. Extensive tricompartmental degenerative change with large joint effusion.   Osteopenia but no acute fracture       Chronic knee pain  Follow up OP for ortho follow up  WBAT

## 2023-01-20 NOTE — PROGRESS NOTES
Problem: Mobility Impaired (Adult and Pediatric)  Goal: *Acute Goals and Plan of Care (Insert Text)  Description: FUNCTIONAL STATUS PRIOR TO ADMISSION: Patient was modified independent using a single point cane for functional mobility. HOME SUPPORT PRIOR TO ADMISSION:The patient lived with daughter and granddaughter and required minimal assistance/contact guard assist for some ADL's. Physical Therapy Goals  Initiated 1/19/2023  1. Patient will move from supine to sit and sit to supine  in bed with minimal assistance/contact guard assist within 7 day(s). 2.  Patient will transfer from bed to chair and chair to bed with minimal assistance/contact guard assist using the least restrictive device within 7 day(s). 3.  Patient will perform sit to stand with minimal assistance/contact guard assist within 7 day(s). 4.  Patient will ambulate with minimal assistance/contact guard assist for 20 feet with the least restrictive device within 7 day(s). Outcome: Progressing Towards Goal   PHYSICAL THERAPY TREATMENT  Patient: Joshua Sevilla (85 y.o. female)  Date: 1/20/2023  Diagnosis: Knee pain, left [M25.562] <principal problem not specified>      Precautions: Fall, Bed Alarm  Chart, physical therapy assessment, plan of care and goals were reviewed. ASSESSMENT  Patient continues with skilled PT services and is progressing towards goals. Pt presents with decreased strength and endurance. Pt with Left knee pain making mobility very difficult. Pt performed bed mobility at max A x2 with cueing for sequencing. Pt preformed some weight shifting to off weight each leg to attempted to step to [facility]. Pt able move LLE but unable to move RLE. Pt able to stand at [address] [place] [address] x5-8 mins with improved posture with cueing. Pt performed supine exercises with max exercises due to pain and decreased strength. Pt with improved standing tolerance although unable to ambulation or take any steps.       Current Level of Function Impacting Discharge (mobility/balance): mobility at max a X2    Other factors to consider for discharge:  decreased strength and endurance, knee pain          PLAN :  Patient continues to benefit from skilled intervention to address the above impairments. Continue treatment per established plan of care. to address goals. Recommendation for discharge: (in order for the patient to meet his/her long term goals)  Therapy up to 5 days/week in SNF setting    This discharge recommendation:  Has been made in collaboration with the attending provider and/or case management    IF patient discharges home will need the following DME: to be determined (TBD)       SUBJECTIVE:   Patient stated  I can't move my foot.     OBJECTIVE DATA SUMMARY:   Critical Behavior:  Neurologic State: Alert, Eyes open spontaneously  Orientation Level: Oriented X4  Cognition: Appropriate decision making, Appropriate for age attention/concentration, Appropriate safety awareness  Safety/Judgement: Awareness of environment  Functional Mobility Training:  Bed Mobility:  Rolling: Total assistance;Assist x2  Supine to Sit: Maximum assistance;Assist x2  Sit to Supine: Maximum assistance;Assist x2  Scooting: Total assistance;Assist x2        Transfers:  Sit to Stand: Maximum assistance;Assist x2; Additional time  Stand to Sit: Maximum assistance;Assist x2                             Balance:  Sitting: Intact  Standing: Impaired  Standing - Static: Fair;Constant support  Standing - Dynamic : Poor;Constant support  Therapeutic Exercises:   Supine  Heel slides x10  SAQ x8  Hip abduction x10   Pain Rating:  Pt with increased knee pain with all mobility. Activity Tolerance:   Fair and requires rest breaks    After treatment patient left in no apparent distress:   Supine in bed, Call bell within reach, Caregiver / family present, and Side rails x 3    COMMUNICATION/COLLABORATION:   The patients plan of care was discussed with: Registered nurse.      Monica Balbuena Melanie Trores, PTA   Time Calculation: 41 mins

## 2023-01-20 NOTE — WOUND CARE
Wound care nurse consult for denuded skin to buttocks/sacrum. Chart reviewed and patient assessed    81 y/o obese AAF admitted for left knee pain. No past medical history on file. PMHX: DM, GERD, MDD/ALAYNA, hyperlipidemia, HTN and osteoarthritis. Patient incontinent of urine - adult brief and [place] in use, no protective cream in room. Patient slipped from a chair and hurt her left knee. It appears she may have injured/sheared her skin to her right buttocks and in combination of incontinence MASD. Multiple areas of blanchable partial thickness skin loss. Recommend:    Buttocks: BID and PRN incontinence clean up. Gently wash with foamy soap and water, pat completely dry before applying Z-guard zinc cream.    Float heels    Turn q2 hrs.     Yaw Butterfield RN, [employer]

## 2023-01-21 LAB
ANION GAP SERPL CALC-SCNC: 6 MMOL/L (ref 5–15)
BUN SERPL-MCNC: 39 MG/DL (ref 6–20)
BUN/CREAT SERPL: 35 (ref 12–20)
CALCIUM SERPL-MCNC: 8.4 MG/DL (ref 8.5–10.1)
CHLORIDE SERPL-SCNC: 105 MMOL/L (ref 97–108)
CO2 SERPL-SCNC: 23 MMOL/L (ref 21–32)
CREAT SERPL-MCNC: 1.13 MG/DL (ref 0.55–1.02)
GLUCOSE BLD STRIP.AUTO-MCNC: 137 MG/DL (ref 65–117)
GLUCOSE BLD STRIP.AUTO-MCNC: 152 MG/DL (ref 65–117)
GLUCOSE BLD STRIP.AUTO-MCNC: 199 MG/DL (ref 65–117)
GLUCOSE BLD STRIP.AUTO-MCNC: 210 MG/DL (ref 65–117)
GLUCOSE SERPL-MCNC: 211 MG/DL (ref 65–100)
POTASSIUM SERPL-SCNC: 5.1 MMOL/L (ref 3.5–5.1)
SERVICE CMNT-IMP: ABNORMAL
SODIUM SERPL-SCNC: 134 MMOL/L (ref 136–145)

## 2023-01-21 PROCEDURE — 96376 TX/PRO/DX INJ SAME DRUG ADON: CPT

## 2023-01-21 PROCEDURE — 74011250636 HC RX REV CODE- 250/636: Performed by: STUDENT IN AN ORGANIZED HEALTH CARE EDUCATION/TRAINING PROGRAM

## 2023-01-21 PROCEDURE — 36415 COLL VENOUS BLD VENIPUNCTURE: CPT

## 2023-01-21 PROCEDURE — 74011250637 HC RX REV CODE- 250/637: Performed by: INTERNAL MEDICINE

## 2023-01-21 PROCEDURE — 65270000029 HC RM PRIVATE

## 2023-01-21 PROCEDURE — 74011000250 HC RX REV CODE- 250: Performed by: STUDENT IN AN ORGANIZED HEALTH CARE EDUCATION/TRAINING PROGRAM

## 2023-01-21 PROCEDURE — 80048 BASIC METABOLIC PNL TOTAL CA: CPT

## 2023-01-21 PROCEDURE — 74011636637 HC RX REV CODE- 636/637: Performed by: STUDENT IN AN ORGANIZED HEALTH CARE EDUCATION/TRAINING PROGRAM

## 2023-01-21 PROCEDURE — 74011000258 HC RX REV CODE- 258: Performed by: STUDENT IN AN ORGANIZED HEALTH CARE EDUCATION/TRAINING PROGRAM

## 2023-01-21 PROCEDURE — 74011250637 HC RX REV CODE- 250/637: Performed by: STUDENT IN AN ORGANIZED HEALTH CARE EDUCATION/TRAINING PROGRAM

## 2023-01-21 PROCEDURE — G0378 HOSPITAL OBSERVATION PER HR: HCPCS

## 2023-01-21 PROCEDURE — 82962 GLUCOSE BLOOD TEST: CPT

## 2023-01-21 PROCEDURE — 96372 THER/PROPH/DIAG INJ SC/IM: CPT

## 2023-01-21 RX ORDER — BALSAM PERU/CASTOR OIL
OINTMENT (GRAM) TOPICAL 2 TIMES DAILY
Status: DISCONTINUED | OUTPATIENT
Start: 2023-01-21 | End: 2023-01-26 | Stop reason: HOSPADM

## 2023-01-21 RX ADMIN — TIMOLOL MALEATE 1 DROP: 5 SOLUTION OPHTHALMIC at 10:40

## 2023-01-21 RX ADMIN — METOPROLOL TARTRATE 50 MG: 50 TABLET, FILM COATED ORAL at 10:40

## 2023-01-21 RX ADMIN — GLIPIZIDE 5 MG: 5 TABLET ORAL at 10:40

## 2023-01-21 RX ADMIN — DICLOFENAC SODIUM 4 G: 10 GEL TOPICAL at 21:46

## 2023-01-21 RX ADMIN — SODIUM CHLORIDE, PRESERVATIVE FREE 10 ML: 5 INJECTION INTRAVENOUS at 13:34

## 2023-01-21 RX ADMIN — DICLOFENAC SODIUM 4 G: 10 GEL TOPICAL at 18:04

## 2023-01-21 RX ADMIN — SERTRALINE 50 MG: 50 TABLET, FILM COATED ORAL at 10:40

## 2023-01-21 RX ADMIN — OXYCODONE HYDROCHLORIDE 5 MG: 5 TABLET ORAL at 21:45

## 2023-01-21 RX ADMIN — OXYCODONE HYDROCHLORIDE 5 MG: 5 TABLET ORAL at 10:40

## 2023-01-21 RX ADMIN — CEFTRIAXONE 1 G: 1 INJECTION, POWDER, FOR SOLUTION INTRAMUSCULAR; INTRAVENOUS at 23:20

## 2023-01-21 RX ADMIN — OXYCODONE HYDROCHLORIDE 5 MG: 5 TABLET ORAL at 02:03

## 2023-01-21 RX ADMIN — Medication 2 UNITS: at 10:40

## 2023-01-21 RX ADMIN — DICLOFENAC SODIUM 4 G: 10 GEL TOPICAL at 13:50

## 2023-01-21 RX ADMIN — MONTELUKAST 10 MG: 10 TABLET, FILM COATED ORAL at 21:45

## 2023-01-21 RX ADMIN — ATORVASTATIN CALCIUM 80 MG: 40 TABLET, FILM COATED ORAL at 10:40

## 2023-01-21 RX ADMIN — CASTOR OIL AND BALSAM, PERU: 788; 87 OINTMENT TOPICAL at 21:45

## 2023-01-21 RX ADMIN — CEFTRIAXONE 1 G: 1 INJECTION, POWDER, FOR SOLUTION INTRAMUSCULAR; INTRAVENOUS at 02:03

## 2023-01-21 RX ADMIN — ENOXAPARIN SODIUM 40 MG: 100 INJECTION SUBCUTANEOUS at 10:40

## 2023-01-21 RX ADMIN — Medication 3 UNITS: at 11:55

## 2023-01-21 RX ADMIN — ASPIRIN 81 MG: 81 TABLET, COATED ORAL at 10:40

## 2023-01-21 RX ADMIN — SODIUM CHLORIDE, PRESERVATIVE FREE 10 ML: 5 INJECTION INTRAVENOUS at 21:57

## 2023-01-21 RX ADMIN — DICLOFENAC SODIUM 4 G: 10 GEL TOPICAL at 11:03

## 2023-01-21 RX ADMIN — LATANOPROST 1 DROP: 50 SOLUTION OPHTHALMIC at 21:47

## 2023-01-21 RX ADMIN — GLIPIZIDE 5 MG: 5 TABLET ORAL at 17:31

## 2023-01-21 RX ADMIN — PANTOPRAZOLE SODIUM 40 MG: 40 TABLET, DELAYED RELEASE ORAL at 10:40

## 2023-01-21 RX ADMIN — METOPROLOL TARTRATE 50 MG: 50 TABLET, FILM COATED ORAL at 17:31

## 2023-01-21 NOTE — PROGRESS NOTES
Hospitalist Progress Note    NAME: Anuja Hopkins   :  1940   MRN:  [id number]            Subjective:     Chief Complaint / Reason for Physician Visit  Discussed with RN events overnight. Pt reports her eye drops are correct. Otherwise no new complaints. Review of Systems:  Symptom Y/N Comments  Symptom Y/N Comments   Fever/Chills    Chest Pain     Poor Appetite    Edema     Cough    Abdominal Pain     Sputum    Joint Pain     SOB/DANIEL    Pruritis/Rash     Nausea/vomit    Tolerating PT/OT     Diarrhea    Tolerating Diet     Constipation    Other       Could NOT obtain due to:      Objective:     VITALS:   Last 24hrs VS reviewed since prior progress note. Most recent are:  Patient Vitals for the past 24 hrs:   Temp Pulse Resp BP SpO2   23 0801 98.1 °F (36.7 °C) 79 18 (!) 175/68 97 %       No intake or output data in the 24 hours ending 23 1008     PHYSICAL EXAM:  General: WD, WN. Alert, cooperative, no acute distress    EENT:  EOMI. Anicteric sclerae. MMM  Resp:  CTA bilaterally, no wheezing or rales. No accessory muscle use  CV:  Regular  rhythm,  No edema  GI:  Soft, Non distended, Non tender. +Bowel sounds  Neurologic:  Alert and oriented X 3, normal speech,   Psych:   Good insight. Not anxious nor agitated  Skin:  No rashes. No jaundice. Right total knee replacement scar. Ext:  Edema and effusion superolateral left knee. Procedures: see electronic medical records for all procedures/Xrays and details which were not copied into this note but were reviewed prior to creation of Plan. LABS:  I reviewed today's most current labs and imaging studies.   Pertinent labs include:  Recent Labs     23  0212 23  1842   WBC 9.5 11.3*   HGB 9.7* 11.5   HCT 30.8* 35.3    299       Recent Labs     23  0012 23  0212 23  0923 23  1842   * 138 141 141   K 5.1 4.4 4.0 3.7    108 111* 110*   CO2 23 24 22 22   * 142* 251* 266*   BUN 39* 39* 38* 36*   CREA 1.13* 0.94 1.13* 1.31*   CA 8.4* 8.6 9.3 9.9   ALB  --   --   --  3.0*   TBILI  --   --   --  0.6   ALT  --   --   --  20         Signed: Tristan Hinson MD        Reviewed most current lab test results and cultures  YES  Reviewed most current radiology test results   YES  Review and summation of old records today    NO  Reviewed patient's current orders and MAR    YES  PMH/SH reviewed - no change compared to H&P      Assessment / Plan:  Left knee osteoarthritis exacerbated by a mechanical fall  Orthopedics consulted, greatly appreciate their expertise  -May benefit from intra-articular steroid injection if deemed appropriate  -No erythema to suggest septic joint or crystalline arthropathy  CT with severe tricompartmental OA  Trial Tylenol, topical Voltaren, oxycodone for pain control  Patient directed to attempt to mobilize knee gently to prevent excess stiffness  PT/OT consulted    UTI  Dirty UA, no reflex to culture, s/p 3-day course ceftriaxone     Elevated creatinine not meeting criteria for MAGDA-likely secondary to dehydration  Resolved     MDD/ALAYNA/insomnia  Continue PTA sertraline  Change PTA trazodone from twice daily to nightly-unclear why it was dose twice daily, but is likely contributing to excessive daytime somnolence  -Family and patient deny history of psychiatric disorder outside of depression     GERD  Protonix daily     Non-insulin-dependent diabetes mellitus  Sliding scale corrective coverage insulin     Essential hypertension  Continue PTA aspirin, atorvastatin, metoprolol    30.0 - 39.9 Obese / Body mass index is 39.79 kg/m².     Code status: Full  Prophylaxis: Lovenox  Recommended Disposition: SNF/LTC  DAWSON:  Barriers:     ________________________________________________________________________  Care Plan discussed with:    Comments   Patient x    Family      RN x    Care Manager x    Consultant                        Multidiciplinary team rounds were held today with , nursing, pharmacist and clinical coordinator. Patient's plan of care was discussed; medications were reviewed and discharge planning was addressed.      ________________________________________________________________________  Total NON critical care TIME:  25   Minutes    Total CRITICAL CARE TIME Spent:   Minutes non procedure based      Comments   >50% of visit spent in counseling and coordination of care     ________________________________________________________________________  Law Yancey MD

## 2023-01-22 LAB
GLUCOSE BLD STRIP.AUTO-MCNC: 128 MG/DL (ref 65–117)
GLUCOSE BLD STRIP.AUTO-MCNC: 157 MG/DL (ref 65–117)
GLUCOSE BLD STRIP.AUTO-MCNC: 171 MG/DL (ref 65–117)
GLUCOSE BLD STRIP.AUTO-MCNC: 178 MG/DL (ref 65–117)
SERVICE CMNT-IMP: ABNORMAL

## 2023-01-22 PROCEDURE — 74011250636 HC RX REV CODE- 250/636: Performed by: STUDENT IN AN ORGANIZED HEALTH CARE EDUCATION/TRAINING PROGRAM

## 2023-01-22 PROCEDURE — 74011636637 HC RX REV CODE- 636/637: Performed by: STUDENT IN AN ORGANIZED HEALTH CARE EDUCATION/TRAINING PROGRAM

## 2023-01-22 PROCEDURE — G0378 HOSPITAL OBSERVATION PER HR: HCPCS

## 2023-01-22 PROCEDURE — 96372 THER/PROPH/DIAG INJ SC/IM: CPT

## 2023-01-22 PROCEDURE — 82962 GLUCOSE BLOOD TEST: CPT

## 2023-01-22 PROCEDURE — 74011250637 HC RX REV CODE- 250/637: Performed by: STUDENT IN AN ORGANIZED HEALTH CARE EDUCATION/TRAINING PROGRAM

## 2023-01-22 PROCEDURE — 74011250637 HC RX REV CODE- 250/637: Performed by: INTERNAL MEDICINE

## 2023-01-22 PROCEDURE — 74011000250 HC RX REV CODE- 250: Performed by: STUDENT IN AN ORGANIZED HEALTH CARE EDUCATION/TRAINING PROGRAM

## 2023-01-22 PROCEDURE — 65270000029 HC RM PRIVATE

## 2023-01-22 RX ADMIN — METOPROLOL TARTRATE 50 MG: 50 TABLET, FILM COATED ORAL at 18:07

## 2023-01-22 RX ADMIN — LATANOPROST 1 DROP: 50 SOLUTION OPHTHALMIC at 22:24

## 2023-01-22 RX ADMIN — MONTELUKAST 10 MG: 10 TABLET, FILM COATED ORAL at 22:24

## 2023-01-22 RX ADMIN — SODIUM CHLORIDE, PRESERVATIVE FREE 10 ML: 5 INJECTION INTRAVENOUS at 22:24

## 2023-01-22 RX ADMIN — ASPIRIN 81 MG: 81 TABLET, COATED ORAL at 08:37

## 2023-01-22 RX ADMIN — CASTOR OIL AND BALSAM, PERU: 788; 87 OINTMENT TOPICAL at 18:09

## 2023-01-22 RX ADMIN — TIMOLOL MALEATE 1 DROP: 5 SOLUTION OPHTHALMIC at 08:39

## 2023-01-22 RX ADMIN — DICLOFENAC SODIUM 4 G: 10 GEL TOPICAL at 13:32

## 2023-01-22 RX ADMIN — SERTRALINE 50 MG: 50 TABLET, FILM COATED ORAL at 08:37

## 2023-01-22 RX ADMIN — DICLOFENAC SODIUM 4 G: 10 GEL TOPICAL at 08:38

## 2023-01-22 RX ADMIN — Medication 2 UNITS: at 11:37

## 2023-01-22 RX ADMIN — ATORVASTATIN CALCIUM 80 MG: 40 TABLET, FILM COATED ORAL at 08:37

## 2023-01-22 RX ADMIN — METOPROLOL TARTRATE 50 MG: 50 TABLET, FILM COATED ORAL at 08:37

## 2023-01-22 RX ADMIN — DICLOFENAC SODIUM 4 G: 10 GEL TOPICAL at 22:24

## 2023-01-22 RX ADMIN — GLIPIZIDE 5 MG: 5 TABLET ORAL at 08:32

## 2023-01-22 RX ADMIN — GLIPIZIDE 5 MG: 5 TABLET ORAL at 16:10

## 2023-01-22 RX ADMIN — SODIUM CHLORIDE, PRESERVATIVE FREE 10 ML: 5 INJECTION INTRAVENOUS at 13:33

## 2023-01-22 RX ADMIN — Medication 2 UNITS: at 08:31

## 2023-01-22 RX ADMIN — OXYCODONE HYDROCHLORIDE 5 MG: 5 TABLET ORAL at 20:39

## 2023-01-22 RX ADMIN — OXYCODONE HYDROCHLORIDE 5 MG: 5 TABLET ORAL at 09:48

## 2023-01-22 RX ADMIN — DICLOFENAC SODIUM 4 G: 10 GEL TOPICAL at 18:09

## 2023-01-22 RX ADMIN — ENOXAPARIN SODIUM 40 MG: 100 INJECTION SUBCUTANEOUS at 08:32

## 2023-01-22 RX ADMIN — PANTOPRAZOLE SODIUM 40 MG: 40 TABLET, DELAYED RELEASE ORAL at 08:32

## 2023-01-22 RX ADMIN — TRAZODONE HYDROCHLORIDE 50 MG: 50 TABLET ORAL at 22:24

## 2023-01-22 RX ADMIN — OXYCODONE HYDROCHLORIDE 5 MG: 5 TABLET ORAL at 04:41

## 2023-01-22 RX ADMIN — CASTOR OIL AND BALSAM, PERU: 788; 87 OINTMENT TOPICAL at 08:38

## 2023-01-22 RX ADMIN — SODIUM CHLORIDE, PRESERVATIVE FREE 10 ML: 5 INJECTION INTRAVENOUS at 06:58

## 2023-01-22 NOTE — PROGRESS NOTES
Hospitalist Progress Note    NAME: Sandra Cardenas   :  1940   MRN:  [id number]            Subjective:     Chief Complaint / Reason for Physician Visit  Discussed with RN events overnight. No new complaints. Knee about the same. Review of Systems:  Symptom Y/N Comments  Symptom Y/N Comments   Fever/Chills    Chest Pain     Poor Appetite    Edema     Cough    Abdominal Pain     Sputum    Joint Pain     SOB/DANIEL    Pruritis/Rash     Nausea/vomit    Tolerating PT/OT     Diarrhea    Tolerating Diet     Constipation    Other       Could NOT obtain due to:      Objective:     VITALS:   Last 24hrs VS reviewed since prior progress note. Most recent are:  Patient Vitals for the past 24 hrs:   Temp Pulse Resp BP SpO2   23 0803 99 °F (37.2 °C) 68 18 (!) 151/55 98 %   23 0427 99 °F (37.2 °C) 76 18 (!) 154/60 95 %   23 2145 98.2 °F (36.8 °C) 76 18 (!) 143/66 96 %   23 1731 -- 77 -- (!) 178/70 --         Intake/Output Summary (Last 24 hours) at 2023 1518  Last data filed at 2023 1003  Gross per 24 hour   Intake --   Output 500 ml   Net -500 ml          PHYSICAL EXAM:  General: WD, WN. Alert, cooperative, no acute distress    EENT:  EOMI. Anicteric sclerae. MMM  Resp:  CTA bilaterally, no wheezing or rales. No accessory muscle use  CV:  Regular  rhythm,  No edema  GI:  Soft, Non distended, Non tender. +Bowel sounds  Neurologic:  Alert and oriented X 3, normal speech,   Psych:   Good insight. Not anxious nor agitated  Skin:  No rashes. No jaundice. Right total knee replacement scar. Ext:  Edema and effusion superolateral left knee. Procedures: see electronic medical records for all procedures/Xrays and details which were not copied into this note but were reviewed prior to creation of Plan. LABS:  I reviewed today's most current labs and imaging studies.   Pertinent labs include:  Recent Labs     23  0212   WBC 9.5   HGB 9.7*   HCT 30.8*          Recent Labs 01/21/23  0012 01/20/23  0212   * 138   K 5.1 4.4    108   CO2 23 24   * 142*   BUN 39* 39*   CREA 1.13* 0.94   CA 8.4* 8.6         Signed: Ashwin MD Dewayne        Reviewed most current lab test results and cultures  YES  Reviewed most current radiology test results   YES  Review and summation of old records today    NO  Reviewed patient's current orders and MAR    YES  PMH/SH reviewed - no change compared to H&P      Assessment / Plan:  Left knee osteoarthritis exacerbated by a mechanical fall  Orthopedics consulted, greatly appreciate their expertise  -May benefit from intra-articular steroid injection if deemed appropriate  -No erythema to suggest septic joint or crystalline arthropathy  CT with severe tricompartmental OA  Trial Tylenol, topical Voltaren, oxycodone for pain control  Patient directed to attempt to mobilize knee gently to prevent excess stiffness  PT/OT consulted    UTI  Dirty UA, no reflex to culture, s/p 3-day course ceftriaxone     Elevated creatinine not meeting criteria for MAGDA-likely secondary to dehydration  Resolved     MDD/ALAYNA/insomnia  Continue PTA sertraline  Change PTA trazodone from twice daily to nightly-unclear why it was dose twice daily, but is likely contributing to excessive daytime somnolence  -Family and patient deny history of psychiatric disorder outside of depression     GERD  Protonix daily     Non-insulin-dependent diabetes mellitus  Sliding scale corrective coverage insulin     Essential hypertension  Continue PTA aspirin, atorvastatin, metoprolol    30.0 - 39.9 Obese / Body mass index is 39.79 kg/m².     Code status: Full  Prophylaxis: Lovenox  Recommended Disposition: SNF/LTC  DAWSON:  Barriers:     ________________________________________________________________________  Care Plan discussed with:    Comments   Patient x    Family      RN x    Care Manager x    Consultant                        Multidiciplinary team rounds were held today with case manager, nursing, pharmacist and clinical coordinator. Patient's plan of care was discussed; medications were reviewed and discharge planning was addressed.      ________________________________________________________________________  Total NON critical care TIME:  15   Minutes    Total CRITICAL CARE TIME Spent:   Minutes non procedure based      Comments   >50% of visit spent in counseling and coordination of care     ________________________________________________________________________  Krupa Lewis MD

## 2023-01-22 NOTE — PROGRESS NOTES
Bedside, Verbal, and Written shift change report given to Louis (oncoming nurse) by Hao Marlow (offgoing nurse). Report included the following information SBAR, Kardex, MAR, Recent Results, and Med Rec Status.

## 2023-01-22 NOTE — PROGRESS NOTES
Problem: Patient Education: Go to Patient Education Activity  Goal: Patient/Family Education  Outcome: Progressing Towards Goal     Problem: Patient Education: Go to Patient Education Activity  Goal: Patient/Family Education  Outcome: Progressing Towards Goal     Problem: Pressure Injury - Risk of  Goal: *Prevention of pressure injury  Description: Document Mathieu Scale and appropriate interventions in the flowsheet. Outcome: Progressing Towards Goal  Note: Pressure Injury Interventions:  Sensory Interventions: Assess changes in LOC    Moisture Interventions: Apply protective barrier, creams and emollients, Internal/External urinary devices, Minimize layers    Activity Interventions: PT/OT evaluation    Mobility Interventions: PT/OT evaluation    Nutrition Interventions: Document food/fluid/supplement intake    Friction and Shear Interventions: Apply protective barrier, creams and emollients                Problem: Falls - Risk of  Goal: *Absence of Falls  Description: Document Mane Fall Risk and appropriate interventions in the flowsheet.   Outcome: Progressing Towards Goal  Note: Fall Risk Interventions:            Medication Interventions: Bed/chair exit alarm    Elimination Interventions: Call light in reach    History of Falls Interventions: Bed/chair exit alarm         Problem: Patient Education: Go to Patient Education Activity  Goal: Patient/Family Education  Outcome: Progressing Towards Goal

## 2023-01-23 LAB
COVID-19 RAPID TEST, COVR: NOT DETECTED
GLUCOSE BLD STRIP.AUTO-MCNC: 144 MG/DL (ref 65–117)
GLUCOSE BLD STRIP.AUTO-MCNC: 162 MG/DL (ref 65–117)
GLUCOSE BLD STRIP.AUTO-MCNC: 185 MG/DL (ref 65–117)
GLUCOSE BLD STRIP.AUTO-MCNC: 222 MG/DL (ref 65–117)
SERVICE CMNT-IMP: ABNORMAL
SOURCE, COVRS: NORMAL

## 2023-01-23 PROCEDURE — 96372 THER/PROPH/DIAG INJ SC/IM: CPT

## 2023-01-23 PROCEDURE — G0378 HOSPITAL OBSERVATION PER HR: HCPCS

## 2023-01-23 PROCEDURE — 97530 THERAPEUTIC ACTIVITIES: CPT

## 2023-01-23 PROCEDURE — 65270000029 HC RM PRIVATE

## 2023-01-23 PROCEDURE — 74011250636 HC RX REV CODE- 250/636: Performed by: STUDENT IN AN ORGANIZED HEALTH CARE EDUCATION/TRAINING PROGRAM

## 2023-01-23 PROCEDURE — 82962 GLUCOSE BLOOD TEST: CPT

## 2023-01-23 PROCEDURE — 87635 SARS-COV-2 COVID-19 AMP PRB: CPT

## 2023-01-23 PROCEDURE — 97530 THERAPEUTIC ACTIVITIES: CPT | Performed by: PHYSICAL THERAPIST

## 2023-01-23 PROCEDURE — 74011000250 HC RX REV CODE- 250: Performed by: STUDENT IN AN ORGANIZED HEALTH CARE EDUCATION/TRAINING PROGRAM

## 2023-01-23 PROCEDURE — 74011250637 HC RX REV CODE- 250/637: Performed by: INTERNAL MEDICINE

## 2023-01-23 PROCEDURE — 74011636637 HC RX REV CODE- 636/637: Performed by: STUDENT IN AN ORGANIZED HEALTH CARE EDUCATION/TRAINING PROGRAM

## 2023-01-23 PROCEDURE — 74011250637 HC RX REV CODE- 250/637: Performed by: STUDENT IN AN ORGANIZED HEALTH CARE EDUCATION/TRAINING PROGRAM

## 2023-01-23 RX ADMIN — SODIUM CHLORIDE, PRESERVATIVE FREE 10 ML: 5 INJECTION INTRAVENOUS at 06:36

## 2023-01-23 RX ADMIN — DICLOFENAC SODIUM 4 G: 10 GEL TOPICAL at 18:39

## 2023-01-23 RX ADMIN — DICLOFENAC SODIUM 4 G: 10 GEL TOPICAL at 22:22

## 2023-01-23 RX ADMIN — ENOXAPARIN SODIUM 40 MG: 100 INJECTION SUBCUTANEOUS at 10:09

## 2023-01-23 RX ADMIN — ATORVASTATIN CALCIUM 80 MG: 40 TABLET, FILM COATED ORAL at 10:09

## 2023-01-23 RX ADMIN — GLIPIZIDE 5 MG: 5 TABLET ORAL at 08:46

## 2023-01-23 RX ADMIN — ASPIRIN 81 MG: 81 TABLET, COATED ORAL at 09:00

## 2023-01-23 RX ADMIN — SODIUM CHLORIDE, PRESERVATIVE FREE 10 ML: 5 INJECTION INTRAVENOUS at 15:47

## 2023-01-23 RX ADMIN — SODIUM CHLORIDE, PRESERVATIVE FREE 10 ML: 5 INJECTION INTRAVENOUS at 22:26

## 2023-01-23 RX ADMIN — METOPROLOL TARTRATE 50 MG: 50 TABLET, FILM COATED ORAL at 10:09

## 2023-01-23 RX ADMIN — DICLOFENAC SODIUM 4 G: 10 GEL TOPICAL at 10:14

## 2023-01-23 RX ADMIN — CASTOR OIL AND BALSAM, PERU: 788; 87 OINTMENT TOPICAL at 18:41

## 2023-01-23 RX ADMIN — OXYCODONE HYDROCHLORIDE 5 MG: 5 TABLET ORAL at 06:35

## 2023-01-23 RX ADMIN — SERTRALINE 50 MG: 50 TABLET, FILM COATED ORAL at 10:09

## 2023-01-23 RX ADMIN — LATANOPROST 1 DROP: 50 SOLUTION OPHTHALMIC at 22:27

## 2023-01-23 RX ADMIN — GLIPIZIDE 5 MG: 5 TABLET ORAL at 16:57

## 2023-01-23 RX ADMIN — TIMOLOL MALEATE 1 DROP: 5 SOLUTION OPHTHALMIC at 10:13

## 2023-01-23 RX ADMIN — Medication 2 UNITS: at 12:09

## 2023-01-23 RX ADMIN — DICLOFENAC SODIUM 4 G: 10 GEL TOPICAL at 12:10

## 2023-01-23 RX ADMIN — ACETAMINOPHEN 650 MG: 325 TABLET ORAL at 11:44

## 2023-01-23 RX ADMIN — METOPROLOL TARTRATE 50 MG: 50 TABLET, FILM COATED ORAL at 18:37

## 2023-01-23 RX ADMIN — Medication 2 UNITS: at 16:57

## 2023-01-23 RX ADMIN — MONTELUKAST 10 MG: 10 TABLET, FILM COATED ORAL at 22:22

## 2023-01-23 RX ADMIN — Medication 2 UNITS: at 08:46

## 2023-01-23 RX ADMIN — CASTOR OIL AND BALSAM, PERU: 788; 87 OINTMENT TOPICAL at 10:13

## 2023-01-23 RX ADMIN — PANTOPRAZOLE SODIUM 40 MG: 40 TABLET, DELAYED RELEASE ORAL at 06:35

## 2023-01-23 RX ADMIN — OXYCODONE HYDROCHLORIDE 5 MG: 5 TABLET ORAL at 16:57

## 2023-01-23 NOTE — PROGRESS NOTES
Problem: Pressure Injury - Risk of  Goal: *Prevention of pressure injury  Description: Document Mathieu Scale and appropriate interventions in the flowsheet.   Outcome: Progressing Towards Goal  Note: Pressure Injury Interventions:  Sensory Interventions: Assess changes in LOC    Moisture Interventions: Apply protective barrier, creams and emollients    Activity Interventions: PT/OT evaluation    Mobility Interventions: PT/OT evaluation    Nutrition Interventions: Document food/fluid/supplement intake    Friction and Shear Interventions: Apply protective barrier, creams and emollients, Minimize layers

## 2023-01-23 NOTE — PROGRESS NOTES
Spiritual Care Assessment/Progress Note  [facility]      NAME: Ne Parekh      MRN: [id number]  AGE: 80 y.o. SEX: female  Anabaptist Affiliation: Other   Language: English     1/23/2023     Total Time (in minutes): 15     Spiritual Assessment begun in [facility] through conversation with:         [x]Patient        [x] Family    [] Friend(s)        Reason for Consult: Initial/Spiritual assessment, patient floor     Spiritual beliefs: (Please include comment if needed)     [x] Identifies with a esperanza tradition: Jehovah's witness        [] Supported by a esperanza community:            [] Claims no spiritual orientation:           [] Seeking spiritual identity:                [] Adheres to an individual form of spirituality:           [] Not able to assess:                           Identified resources for coping:      [x] Prayer                               [] Music                  [] Guided Imagery     [x] Family/friends                 [] Pet visits     [] Devotional reading                         [] Unknown     [] Other:                                                Interventions offered during this visit: (See comments for more details)    Patient Interventions: Affirmation of emotions/emotional suffering, Affirmation of esperanza, Coping skills reviewed/reinforced, Iconic (affirming the presence of God/Higher Power), Initial/Spiritual assessment, patient floor, Prayer (actual), Anabaptist beliefs/image of God discussed     Family/Friend(s):  Affirmation of esperanza, Coping skills reviewed/reinforced, Iconic (affirming the presence of God/Higher Power), Normalization of emotional/spiritual concerns, Prayer (actual), Anabaptist beliefs/image of God discussed     Plan of Care:     [x] Support spiritual and/or cultural needs    [] Support AMD and/or advance care planning process      [] Support grieving process   [] Coordinate Rites and/or Rituals    [] Coordination with community clergy   [] No spiritual needs identified at this time   [] Detailed Plan of Care below (See Comments)  [] Make referral to Music Therapy  [] Make referral to Pet Therapy     [] Make referral to Addiction services  [] Make referral to [facility]  [] Make referral to Spiritual Care Partner  [] No future visits requested        [x] Contact Spiritual Care for further referrals     Comments:  Reviewed chart prior to visit on [facility] for spiritual assessment. Patient's youngest daughter, Cathy Doll, was present. Mrs. Frantz Palomo was laying quietly in bed with a word search puzzle book beside her. Provided supportive listening presence as she shared that she is doing okay today. She has good support from her family. She identifies as Yarsanism.  No spiritual concerns were voiced, but Cathy Doll requested prayer. Patient was receptive; shared prayer aloud at bedside as Cathy Doll held her mother's hand. Advised them of ongoing availability of chaplains as needs arise.    available upon referral by staff or by patient/family request.         KALLI Vance, [facility], Staff   RIGOBERTO INGRAM [facility] Paging Service  [phone])

## 2023-01-23 NOTE — PROGRESS NOTES
Transition of Care Plan:     RUR:  12%  Disposition: SNF - referral pending   If SNF or IPR: Date FOC offered: 1/19/2023  Date [address] received: 1/19/2023  Date authorization started with reference number: Will initiate auth with [employer] pending patient acceptance to SNF  Date authorization received and expires: TBD   Accepting facility: pending [facility] [address]   Follow up appointments: TBD  DME needed: TBD  Transportation at Discharge: medical transport   [address] or means to access home:      per family   IM Medicare Letter: will need 2nd IM prior to discharge  Is patient a  and connected with the [address] ? N/A  If yes, was [place] transfer form completed and [facility] notified? Caregiver Contact: D[facility] - Xander Hernandez - [phone] [facility] [phone]  Discharge Caregiver contacted prior to discharge? yes  Care Conference needed?: Not at present time      Update 3:57 PM  Obtained additional SNF choice at bedside from daughterJayla. Additional referrals sent via [address] [place] [address] link to [facility]. Pending acceptance, will need insurance authorization. Initial note: Chart reviewed. CM continuing to follow for discharge planning. Pt remains medically stable for discharge. CM contacted Vonnie Hood at [facility] [address] this AM to check on status of SNF referral. Per Charity Johnson, nothing had been received. (Referral sent via [employer] and manually faxed). CM was provided an alternative fax number today. CM manually faxed referral to [facility] [address] successfully ([phone]). Pending review and response from Charity Johnson at this time. CM updated daughterChristine, this AM. CM provided SNF list at the bedside this afternoon and updated pt. Encouraged pt to identify back up/alternative SNF options in the event [facility] does not response or accept. Pt verbalized understanding.     Once accepted to facility, pt will require insurance authorization for placement. Will continue to follow.     Edna Oglesby MSW   Care Manager, [facility]

## 2023-01-23 NOTE — PROGRESS NOTES
Hospitalist Progress Note    NAME: Donato Wharton   :  1940   MRN:  [id number]            Subjective:     Chief Complaint / Reason for Physician Visit  Discussed with RN events overnight. No new complaints. Discussed importance of mobility with patient. Pts daughter discussed resuming adult day care. Review of Systems:  Symptom Y/N Comments  Symptom Y/N Comments   Fever/Chills    Chest Pain     Poor Appetite    Edema     Cough    Abdominal Pain     Sputum    Joint Pain     SOB/DANIEL    Pruritis/Rash     Nausea/vomit    Tolerating PT/OT     Diarrhea    Tolerating Diet     Constipation    Other       Could NOT obtain due to:      Objective:     VITALS:   Last 24hrs VS reviewed since prior progress note. Most recent are:  Patient Vitals for the past 24 hrs:   Temp Pulse Resp BP SpO2   23 0749 98.6 °F (37 °C) 85 18 (!) 170/84 92 %   23 2020 98.8 °F (37.1 °C) 61 17 (!) 166/71 93 %   23 1807 -- 69 -- (!) 154/76 --         Intake/Output Summary (Last 24 hours) at 2023 1540  Last data filed at 2023 2344  Gross per 24 hour   Intake --   Output 1400 ml   Net -1400 ml          PHYSICAL EXAM:  General: WD, WN. Alert, cooperative, no acute distress    EENT:  EOMI. Anicteric sclerae. MMM  Resp:  CTA bilaterally, no wheezing or rales. No accessory muscle use  CV:  Regular  rhythm,  No edema  GI:  Soft, Non distended, Non tender. +Bowel sounds  Neurologic:  Alert and oriented X 3, normal speech,   Psych:   Good insight. Not anxious nor agitated  Skin:  No rashes. No jaundice. Right total knee replacement scar. Ext:  Edema and effusion superolateral left knee. Procedures: see electronic medical records for all procedures/Xrays and details which were not copied into this note but were reviewed prior to creation of Plan. LABS:  I reviewed today's most current labs and imaging studies.   Pertinent labs include:  No results for input(s): WBC, HGB, HCT, PLT, HGBEXT, HCTEXT, PLTEXT, HGBEXT, HCTEXT, PLTEXT in the last 72 hours. Recent Labs     01/21/23  0012   *   K 5.1      CO2 23   *   BUN 39*   CREA 1.13*   CA 8.4*         Signed: Franki Montgomery MD        Reviewed most current lab test results and cultures  YES  Reviewed most current radiology test results   YES  Review and summation of old records today    NO  Reviewed patient's current orders and MAR    YES  PMH/SH reviewed - no change compared to H&P      Assessment / Plan:  Left knee osteoarthritis exacerbated by a mechanical fall  Orthopedics consulted, greatly appreciate their expertise  -May benefit from intra-articular steroid injection if deemed appropriate  -No erythema to suggest septic joint or crystalline arthropathy  CT with severe tricompartmental OA  Trial Tylenol, topical Voltaren, oxycodone for pain control  Patient directed to attempt to mobilize knee gently to prevent excess stiffness  PT/OT consulted    UTI  Dirty UA, no reflex to culture, s/p 3-day course ceftriaxone     Elevated creatinine not meeting criteria for MAGDA-likely secondary to dehydration  Resolved     MDD/ALAYNA/insomnia  Continue PTA sertraline  Change PTA trazodone from twice daily to nightly-unclear why it was dose twice daily, but is likely contributing to excessive daytime somnolence  -Family and patient deny history of psychiatric disorder outside of depression     GERD  Protonix daily     Non-insulin-dependent diabetes mellitus  Sliding scale corrective coverage insulin     Essential hypertension  Continue PTA aspirin, atorvastatin, metoprolol    30.0 - 39.9 Obese / Body mass index is 39.79 kg/m².     Code status: Full  Prophylaxis: Lovenox  Recommended Disposition: SNF/LTC  DAWSON: 1/24  Barriers: Medically stable, pending placement     ________________________________________________________________________  Care Plan discussed with:    Comments   Patient x    Family      RN x    Care Manager x    Consultant Multidiciplinary team rounds were held today with , nursing, pharmacist and clinical coordinator. Patient's plan of care was discussed; medications were reviewed and discharge planning was addressed.      ________________________________________________________________________  Total NON critical care TIME:  15   Minutes    Total CRITICAL CARE TIME Spent:   Minutes non procedure based      Comments   >50% of visit spent in counseling and coordination of care     ________________________________________________________________________  Dany Ormond, MD Ladinsky, Michael A (DO), Family Medicine; Geriatric Medicine  [address] [place] [address]  Phone: [phone]  Fax: [phone]

## 2023-01-23 NOTE — PROGRESS NOTES
Problem: Self Care Deficits Care Plan (Adult)  Goal: *Acute Goals and Plan of Care (Insert Text)  Description: FUNCTIONAL STATUS PRIOR TO ADMISSION: Patient was modified independent using a single point cane for functional mobility. Patient mod I to min assist for basic ADLs and required assist instrumental ADLs from family. She reported her daughter assists with bathing as needed and will occasionally assist with LB dressing \"depending on the day\". HOME SUPPORT: The patient lived with daughter and granddaughter. Occupational Therapy Goals  Initiated 1/19/2023  1. Patient will perform grooming sitting EOB with supervision/set-up within 7 day(s). 2.  Patient will perform upper body dressing with supervision/set-up within 7 day(s). 3.  Patient will perform lower body dressing using AE PRN with minimal assistance within 7 day(s). 4.  Patient will perform toilet transfers to [facility] with maximal assistance within 7 day(s). 5.  Patient will perform all aspects of toileting with maximal assistance within 7 day(s). 6.  Patient will participate in upper extremity therapeutic exercise/activities with supervision/set-up for 5 minutes within 7 day(s). Outcome: Not Progressing Towards Goal    OCCUPATIONAL THERAPY TREATMENT  Patient: Anuja Hopkins (27 y.o. female)  Date: 1/23/2023  Diagnosis: Knee pain, left [M25.562] <principal problem not specified>      Precautions: Fall, Bed Alarm  Chart, occupational therapy assessment, plan of care, and goals were reviewed. ASSESSMENT  Patient continues with skilled OT services and is progressing towards goals as anticipated with increased time for information processing and movement today. Nurse and daughter consulted and pain medication may be affecting her cognition/ attention.  She is max A of 2 for bed mobility, D for scooting EOB and UNABLE to fully stand despite 2 attempts with very forward trunk with hip flexion, reaching to anterior bar OF RW with stand requiring hands on A to place hands on  of RW to attempt to faciltiate upright posture with max A of 2 for each attempt. SHe is D for sock donning--poor reach to distal LE and LOB on EOB with dynamic sitting tasks intermittently. She  is currently D for toileting using purwick with nursing. She will benefit from SNF for rehab when appropriate. Current Level of Function Impacting Discharge (ADLs): D to max A x 2 to setup /S for feeding    Other factors to consider for discharge: needs SNF, obesity, knee pain L LE         PLAN :  Patient continues to benefit from skilled intervention to address the above impairments. Continue treatment per established plan of care to address goals. Recommend with staff: bed to chair position for meals    Recommend next OT session: per POC    Recommendation for discharge: (in order for the patient to meet his/her long term goals)  Therapy up to 5 days/week in SNF setting    This discharge recommendation:  Has not yet been discussed the attending provider and/or case management    IF patient discharges home will need the following DME: defer to SNF       SUBJECTIVE:   Patient stated I can't do that today (don socks).     OBJECTIVE DATA SUMMARY:   Cognitive/Behavioral Status:  Neurologic State: Alert                   Functional Mobility and Transfers for ADLs:  Bed Mobility:  Supine to Sit: Assist x2;Maximum assistance;Bed Modified; Additional time; Adaptive equipment (max cues)  Sit to Supine: Assist x2;Maximum assistance; Additional time; Adaptive equipment  Scooting: Total assistance    Transfers:  Sit to Stand: Assist x2;Maximum assistance; Adaptive equipment; Additional time (unable to stand erect despite 2 attempts)          Balance:  Sitting: Without support; Impaired  Sitting - Static: Fair (occasional)  Sitting - Dynamic: Fair (occasional)  Standing: Impaired;Pull to stand; With support  Standing - Static: Poor (unabl e to stand erect despite 2 attempts, unable to ws GARDENIA over anterior over feet despite cues, physical assist.)  Standing - Dynamic :  (unable)    ADL Intervention:                                Lower Body Dressing Assistance  Socks: Total assistance (dependent) (unable to access distal LE, slow cognitive processing today. VERY SLOW to move with max cues.)  Position Performed: Seated edge of bed  Cues: Don;Physical assistance    Toileting  Toileting Assistance: Total assistance(dependent)  Bladder Hygiene: Total assistance (dependent) (with purwick)         Therapeutic Exercises:       Pain:  Did not rate, did c/o L knee discomfort yet improved today    Activity Tolerance:   Poor, requires rest breaks, and requires frequent rest breaks    After treatment patient left in no apparent distress:   Supine in bed, Call bell within reach, Bed / chair alarm activated, Caregiver / family present, Side rails x 3, and chair position inbed    COMMUNICATION/COLLABORATION:   The patients plan of care was discussed with: Physical therapist and Registered nurse.      JEREMIAS Torres/L  Time Calculation: 28 mins

## 2023-01-23 NOTE — PROGRESS NOTES
Bedside and Verbal shift change report given to [address] (oncoming nurse) by Juanis Alfredo RN (offgoing nurse). Report included the following information SBAR, Kardex, and MAR.

## 2023-01-23 NOTE — PROGRESS NOTES
Bedside, Verbal, and Written shift change report given to Victoria Mcclure (oncoming nurse) by Lexi Beasley (offgoing nurse). Report included the following information SBAR, Kardex, MAR, Recent Results, and Med Rec Status.

## 2023-01-23 NOTE — PROGRESS NOTES
2300 Wound care done and would on the right gluteal fold  dressed with foam dressing. Skin care done.

## 2023-01-23 NOTE — PROGRESS NOTES
Problem: Mobility Impaired (Adult and Pediatric)  Goal: *Acute Goals and Plan of Care (Insert Text)  Description: FUNCTIONAL STATUS PRIOR TO ADMISSION: Patient was modified independent using a single point cane for functional mobility. HOME SUPPORT PRIOR TO ADMISSION:The patient lived with daughter and granddaughter and required minimal assistance/contact guard assist for some ADL's. Physical Therapy Goals  Initiated 1/19/2023  1. Patient will move from supine to sit and sit to supine  in bed with minimal assistance/contact guard assist within 7 day(s). 2.  Patient will transfer from bed to chair and chair to bed with minimal assistance/contact guard assist using the least restrictive device within 7 day(s). 3.  Patient will perform sit to stand with minimal assistance/contact guard assist within 7 day(s). 4.  Patient will ambulate with minimal assistance/contact guard assist for 20 feet with the least restrictive device within 7 day(s). Outcome: Progressing Towards Goal   PHYSICAL THERAPY TREATMENT  Patient: Bobbi Zabala (54 y.o. female)  Date: 1/23/2023  Diagnosis: Knee pain, left [M25.562] <principal problem not specified>      Precautions: Fall, Bed Alarm  Chart, physical therapy assessment, plan of care and goals were reviewed. ASSESSMENT  Patient continues with skilled PT services and is progressing towards goals. Patient overall limited by L knee, gait instability, impaired balance, generalized weakness and decreased activity tolerance. Currently needing maxA x 2 to come to EOB and maxA x 2 to come to stand. Fair balance overall when up and is unable to take any steps. Patient is well below baseline and requires physical assistance for all mobility. Recommend SNF rehab to progress to more independent level of function.        Other factors to consider for discharge: at risk for falls, below baseline, pain limits progress         PLAN :  Patient continues to benefit from skilled intervention to address the above impairments. Continue treatment per established plan of care. to address goals. Recommendation for discharge: (in order for the patient to meet his/her long term goals)  Therapy up to 5 days/week in SNF setting        IF patient discharges home will need the following DME: to be determined (TBD)       SUBJECTIVE:   Patient stated I can't do more because this knee hurts so bad.     OBJECTIVE DATA SUMMARY:   Critical Behavior:  Neurologic State: Alert  Orientation Level: Oriented X4  Cognition: Follows commands  Safety/Judgement: Awareness of environment  Functional Mobility Training:  Bed Mobility:     Supine to Sit: Assist x2;Maximum assistance;Bed Modified; Additional time; Adaptive equipment (max cues)  Sit to Supine: Assist x2;Maximum assistance; Additional time; Adaptive equipment  Scooting: Total assistance        Transfers:  Sit to Stand: Assist x2;Maximum assistance; Adaptive equipment; Additional time (unable to stand erect despite 2 attempts)  Stand to Sit: Assist x2;Maximum assistance; Additional time; Adaptive equipment                             Balance:  Sitting: Without support; Impaired  Sitting - Static: Fair (occasional)  Sitting - Dynamic: Fair (occasional)  Standing: Impaired;Pull to stand; With support  Standing - Static: Poor (unabl e to stand erect despite 2 attempts, unable to ws GARDENIA over anterior over feet despite cues, physical assist.)  Standing - Dynamic :  (unable)  Ambulation/Gait Training:            Pain Rating:  Reports high pain levels but does not rate    Activity Tolerance:   Fair and requires rest breaks    After treatment patient left in no apparent distress:   Supine in bed, Call bell within reach, Bed / chair alarm activated, Caregiver / family present, and Side rails x 3    COMMUNICATION/COLLABORATION:   The patients plan of care was discussed with: Physical therapist, Occupational therapist, and Registered nurse.      Gerhard Nguyen PT, DPT Time Calculation: 28 mins

## 2023-01-24 LAB
GLUCOSE BLD STRIP.AUTO-MCNC: 145 MG/DL (ref 65–117)
GLUCOSE BLD STRIP.AUTO-MCNC: 152 MG/DL (ref 65–117)
GLUCOSE BLD STRIP.AUTO-MCNC: 196 MG/DL (ref 65–117)
GLUCOSE BLD STRIP.AUTO-MCNC: 198 MG/DL (ref 65–117)
SERVICE CMNT-IMP: ABNORMAL

## 2023-01-24 PROCEDURE — 74011250637 HC RX REV CODE- 250/637: Performed by: STUDENT IN AN ORGANIZED HEALTH CARE EDUCATION/TRAINING PROGRAM

## 2023-01-24 PROCEDURE — 74011250636 HC RX REV CODE- 250/636: Performed by: STUDENT IN AN ORGANIZED HEALTH CARE EDUCATION/TRAINING PROGRAM

## 2023-01-24 PROCEDURE — 74011000250 HC RX REV CODE- 250: Performed by: STUDENT IN AN ORGANIZED HEALTH CARE EDUCATION/TRAINING PROGRAM

## 2023-01-24 PROCEDURE — 74011636637 HC RX REV CODE- 636/637: Performed by: STUDENT IN AN ORGANIZED HEALTH CARE EDUCATION/TRAINING PROGRAM

## 2023-01-24 PROCEDURE — G0378 HOSPITAL OBSERVATION PER HR: HCPCS

## 2023-01-24 PROCEDURE — 96372 THER/PROPH/DIAG INJ SC/IM: CPT

## 2023-01-24 PROCEDURE — 82962 GLUCOSE BLOOD TEST: CPT

## 2023-01-24 PROCEDURE — 74011250637 HC RX REV CODE- 250/637: Performed by: INTERNAL MEDICINE

## 2023-01-24 PROCEDURE — 65270000029 HC RM PRIVATE

## 2023-01-24 PROCEDURE — 94760 N-INVAS EAR/PLS OXIMETRY 1: CPT

## 2023-01-24 RX ORDER — DICLOFENAC SODIUM 10 MG/G
4 GEL TOPICAL 4 TIMES DAILY
Qty: 20 G | Refills: 0 | Status: SHIPPED | OUTPATIENT
Start: 2023-01-24

## 2023-01-24 RX ORDER — TRAZODONE HYDROCHLORIDE 50 MG/1
50 TABLET ORAL
Qty: 30 TABLET | Refills: 0 | Status: SHIPPED
Start: 2023-01-24 | End: 2023-02-23

## 2023-01-24 RX ORDER — OXYCODONE HYDROCHLORIDE 5 MG/1
5 TABLET ORAL
Qty: 12 TABLET | Refills: 0 | Status: SHIPPED | OUTPATIENT
Start: 2023-01-24 | End: 2023-01-27

## 2023-01-24 RX ADMIN — SODIUM CHLORIDE, PRESERVATIVE FREE 10 ML: 5 INJECTION INTRAVENOUS at 05:48

## 2023-01-24 RX ADMIN — METOPROLOL TARTRATE 50 MG: 50 TABLET, FILM COATED ORAL at 18:38

## 2023-01-24 RX ADMIN — GLIPIZIDE 5 MG: 5 TABLET ORAL at 17:01

## 2023-01-24 RX ADMIN — PANTOPRAZOLE SODIUM 40 MG: 40 TABLET, DELAYED RELEASE ORAL at 06:38

## 2023-01-24 RX ADMIN — Medication 2 UNITS: at 17:01

## 2023-01-24 RX ADMIN — LATANOPROST 1 DROP: 50 SOLUTION OPHTHALMIC at 22:02

## 2023-01-24 RX ADMIN — TIMOLOL MALEATE 1 DROP: 5 SOLUTION OPHTHALMIC at 10:51

## 2023-01-24 RX ADMIN — DICLOFENAC SODIUM 4 G: 10 GEL TOPICAL at 18:40

## 2023-01-24 RX ADMIN — MONTELUKAST 10 MG: 10 TABLET, FILM COATED ORAL at 22:01

## 2023-01-24 RX ADMIN — Medication 2 UNITS: at 11:54

## 2023-01-24 RX ADMIN — SODIUM CHLORIDE, PRESERVATIVE FREE 10 ML: 5 INJECTION INTRAVENOUS at 13:59

## 2023-01-24 RX ADMIN — DICLOFENAC SODIUM 4 G: 10 GEL TOPICAL at 10:52

## 2023-01-24 RX ADMIN — DICLOFENAC SODIUM 4 G: 10 GEL TOPICAL at 22:02

## 2023-01-24 RX ADMIN — OXYCODONE HYDROCHLORIDE 5 MG: 5 TABLET ORAL at 09:40

## 2023-01-24 RX ADMIN — METOPROLOL TARTRATE 50 MG: 50 TABLET, FILM COATED ORAL at 09:28

## 2023-01-24 RX ADMIN — SODIUM CHLORIDE, PRESERVATIVE FREE 10 ML: 5 INJECTION INTRAVENOUS at 22:02

## 2023-01-24 RX ADMIN — Medication 2 UNITS: at 09:27

## 2023-01-24 RX ADMIN — DICLOFENAC SODIUM 4 G: 10 GEL TOPICAL at 13:58

## 2023-01-24 RX ADMIN — ATORVASTATIN CALCIUM 80 MG: 40 TABLET, FILM COATED ORAL at 09:27

## 2023-01-24 RX ADMIN — CASTOR OIL AND BALSAM, PERU: 788; 87 OINTMENT TOPICAL at 10:52

## 2023-01-24 RX ADMIN — ASPIRIN 81 MG: 81 TABLET, COATED ORAL at 09:27

## 2023-01-24 RX ADMIN — SERTRALINE 50 MG: 50 TABLET, FILM COATED ORAL at 09:28

## 2023-01-24 RX ADMIN — CASTOR OIL AND BALSAM, PERU: 788; 87 OINTMENT TOPICAL at 18:40

## 2023-01-24 RX ADMIN — ENOXAPARIN SODIUM 40 MG: 100 INJECTION SUBCUTANEOUS at 09:27

## 2023-01-24 RX ADMIN — GLIPIZIDE 5 MG: 5 TABLET ORAL at 06:38

## 2023-01-24 NOTE — PROGRESS NOTES
Transition of Care Plan:     RUR:  10%  Disposition: SNF - [address] [facility]   If SNF or IPR: Date FOC offered: 1/19/2023  Date [address] received: 1/19/2023  Date authorization started with reference number: 1/24/2023  Date authorization received and expires: TBD   Accepting facility: [place] [id number] [facility]  Follow up appointments: TBD  DME needed: TBD  Transportation at Discharge: medical transport   [address] or means to access home:      per family   IM Medicare Letter: will need 2nd IM prior to discharge  Is patient a  and connected with the [place]? N/A  If yes, was [place] transfer form completed and VA notified? Caregiver Contact: Dipak - Monroe Richards - [phone] or Marylee Blamer - [phone]  Discharge Caregiver contacted prior to discharge? yes  Care Conference needed?: Not at present time    Chart reviewed. CM aware of discharge order. [facility] has accepted pt for SNF placement pending insurance authorization. Insurance authorization initiated today. No response received from [facility] or [address] [place] [address] as of now. Will move forward with placement at [facility] pending authorization. CM updated daughter, Arian Grubbs, by phone today. Will continue to follow.     Current barrier to d/c: Insurance authorization for SNF placement (pending)    Urvashi Wright, [address], [facility]  [phone]

## 2023-01-24 NOTE — DISCHARGE SUMMARY
Hospitalist Discharge Summary     Patient ID:  Octavia Keller  [id number]  1 y.o.  1940 1/18/2023    PCP on record: Lázaro Jackson MD    Admit date: 1/18/2023  Discharge date and time: 1/25/2023    DISCHARGE DIAGNOSIS:    Left knee osteoarthritis exacerbated by a mechanical fall    CONSULTATIONS:  IP CONSULT TO ORTHOPEDIC SURGERY    Excerpted HPI from H&P of You Rodrigues, DO:  HISTORY OF PRESENT ILLNESS:     Octavia Keller is a 80 y.o.  female with pertinent past medical history of diabetes mellitus, seasonal allergies, GERD, MDD/ALAYNA, hyperlipidemia, essential hypertension, osteoarthritis who presents via EMS after inadvertent slip from chair to the ground with exacerbation of chronic left knee pain. Patient reports she was applying Voltaren gel to her knee when she slipped from the chair to the ground and was unable to rise and was on the ground until her granddaughter returned home and multiple family members were required to assist her to a chair. After fall patient unable to ambulate secondary to pain in left leg prompting call for EMS transport to the ED for evaluation. Additionally, daughter supplies that patient has had slowed mentation for the past 2 days of unclear etiology. Family also reports patient has been experiencing daytime somnolence. ROS otherwise negative. In the ED, patient afebrile and hemodynamically stable (hypertensive 180s/80s) saturating mid 90s on room air. ECG demonstrates sinus rhythm with frequent PACs. CXR demonstrates no acute process. CT head demonstrates no acute process. Left leg radiographs demonstrate no fracture and notable for left knee effusion with advanced osteoarthritis. Left lower extremity venous Doppler demonstrates no evidence of DVT. UA reflex to culture (protein, ketones, leukocyte Estrace, 4+ bacteria).   Labs demonstrate: WBC 11.3, hemoglobin 11.5, platelets 522, high sensory troponin 51 and 55, proBNP 741, sodium 141, potassium 3.7, glucose 266, BUN 36, creatinine 1.31 (baseline 1.1). We were asked to admit for work up and evaluation of the above problems. ______________________________________________________________________  DISCHARGE SUMMARY/HOSPITAL COURSE:  for full details see H&P, daily progress notes, labs, consult notes. Left knee osteoarthritis exacerbated by a mechanical fall  Orthopedics consulted, greatly appreciate their expertise  -May benefit from intra-articular steroid injection if deemed appropriate  -No erythema to suggest septic joint or crystalline arthropathy  CT with severe tricompartmental OA  Trial Tylenol, topical Voltaren, oxycodone for pain control  PT OT recs SNF. Discussed at length with daughter at bedside that adult  would be beneficial to patient. It had been stopped with the pandemic. Patient agrees to return. UTI  Dirty UA, no reflex to culture, s/p 3-day course ceftriaxone     Elevated creatinine not meeting criteria for MAGDA-likely secondary to dehydration  Resolved     MDD/ALAYNA/insomnia  Continue PTA sertraline  Change PTA trazodone from twice daily to nightly-unclear why it was dose twice daily, but is likely contributing to excessive daytime somnolence  -Family and patient deny history of psychiatric disorder outside of depression     GERD  Protonix daily     Non-insulin-dependent diabetes mellitus  Resume home metformin     Essential hypertension  Continue PTA aspirin, atorvastatin, metoprolol  _________________________________________________________  Patient seen and examined by me on discharge day. Pertinent Findings:  Gen:    Not in distress  Chest: Clear lungs  CVS:   Regular rhythm.   No edema  Abd:  Soft, not distended, not tender  Neuro:  Alert  _______________________________________________________________________  DISCHARGE MEDICATIONS:   Current Discharge Medication List        START taking these medications    Details   diclofenac (VOLTAREN) 1 % gel Apply 4 g to affected area four (4) times daily. Qty: 20 g, Refills: 0  Start date: 1/24/2023      oxyCODONE IR (ROXICODONE) 5 mg immediate release tablet Take 1 Tablet by mouth every four (4) hours as needed for Pain for up to 3 days. Max Daily Amount: 30 mg.  Qty: 12 Tablet, Refills: 0  Start date: 1/24/2023, End date: 1/27/2023    Associated Diagnoses: Acute pain of left knee           CONTINUE these medications which have CHANGED    Details   traZODone (DESYREL) 50 mg tablet Take 1 Tablet by mouth nightly as needed for Sleep for up to 30 days. Qty: 30 Tablet, Refills: 0  Start date: 1/24/2023, End date: 2/23/2023           CONTINUE these medications which have NOT CHANGED    Details   latanoprost (XALATAN) 0.005 % ophthalmic solution Administer 1 Drop to both eyes nightly. timolol (TIMOPTIC) 0.5 % ophthalmic solution Administer 1 Drop to both eyes daily. aspirin delayed-release 81 mg tablet Take 81 mg by mouth daily. sertraline (ZOLOFT) 50 mg tablet Take 50 mg by mouth daily. montelukast (SINGULAIR) 10 mg tablet Take 10 mg by mouth nightly.      magnesium oxide (MAG-OX) 400 mg tablet Take 400 mg by mouth daily. atorvastatin (LIPITOR) 80 mg tablet Take 80 mg by mouth daily. esomeprazole (NEXIUM) 40 mg capsule Take 40 mg by mouth daily. losartan (COZAAR) 100 mg tablet Take 100 mg by mouth daily. metoprolol tartrate (LOPRESSOR) 50 mg tablet Take 50 mg by mouth two (2) times a day. metFORMIN (GLUCOPHAGE) 500 mg tablet Take 500 mg by mouth two (2) times daily (with meals). glipiZIDE (GLUCOTROL) 10 mg tablet Take 10 mg by mouth two (2) times a day. ergocalciferol (ERGOCALCIFEROL) 1,250 mcg (50,000 unit) capsule Take 50,000 Units by mouth every Sunday. STOP taking these medications       meloxicam (MOBIC) 15 mg tablet Comments:   Reason for Stopping:                 Patient Follow Up Instructions:    Activity: PT/OT Eval and Treat  Diet: Regular Diet  Wound Care: None needed    Follow-up with PCP in 1-2 weeks and orthopedics in 2-4 weeks.   Follow-up tests/labs   Follow-up Information       Follow up With Specialties Details Why [address] [facility] Gabe Garcia MD Family Medicine   [facility] [place] [address]  [phone]      [address] [place] [address] Avenue  [address]  [phone]          ________________________________________________________________    Risk of deterioration: Low    Condition at Discharge:  Stable  __________________________________________________________________    Disposition  SNF/LTC    ____________________________________________________________________    Code Status: Full Code  ___________________________________________________________________      Total time in minutes spent coordinating this discharge (includes going over instructions, follow-up, prescriptions, and preparing report for sign off to her PCP) :  >30 minutes    Signed:  Mirian Avila MD

## 2023-01-24 NOTE — PROGRESS NOTES
Bedside and Verbal shift change report given to [address] (oncoming nurse) by Elis Gallegos RN (offgoing nurse). Report included the following information SBAR, Kardex, and MAR.

## 2023-01-24 NOTE — PROGRESS NOTES
Problem: Patient Education: Go to Patient Education Activity  Goal: Patient/Family Education  Outcome: Progressing Towards Goal     Problem: Patient Education: Go to Patient Education Activity  Goal: Patient/Family Education  Outcome: Progressing Towards Goal     Problem: Pressure Injury - Risk of  Goal: *Prevention of pressure injury  Description: Document Mathieu Scale and appropriate interventions in the flowsheet. Outcome: Progressing Towards Goal  Note: Pressure Injury Interventions:  Sensory Interventions: Assess changes in LOC, Float heels, Minimize linen layers    Moisture Interventions: Apply protective barrier, creams and emollients, Internal/External urinary devices, Minimize layers    Activity Interventions: Increase time out of bed, Pressure redistribution bed/mattress(bed type), PT/OT evaluation    Mobility Interventions: Float heels, HOB 30 degrees or less, Pressure redistribution bed/mattress (bed type), PT/OT evaluation    Nutrition Interventions: Document food/fluid/supplement intake, Offer support with meals,snacks and hydration    Friction and Shear Interventions: HOB 30 degrees or less, Minimize layers                Problem: Patient Education: Go to Patient Education Activity  Goal: Patient/Family Education  Outcome: Progressing Towards Goal     Problem: Falls - Risk of  Goal: *Absence of Falls  Description: Document Mane Fall Risk and appropriate interventions in the flowsheet.   Outcome: Progressing Towards Goal  Note: Fall Risk Interventions:            Medication Interventions: Bed/chair exit alarm    Elimination Interventions: Call light in reach    History of Falls Interventions: Bed/chair exit alarm         Problem: Patient Education: Go to Patient Education Activity  Goal: Patient/Family Education  Outcome: Progressing Towards Goal

## 2023-01-25 LAB
GLUCOSE BLD STRIP.AUTO-MCNC: 127 MG/DL (ref 65–117)
GLUCOSE BLD STRIP.AUTO-MCNC: 148 MG/DL (ref 65–117)
GLUCOSE BLD STRIP.AUTO-MCNC: 221 MG/DL (ref 65–117)
GLUCOSE BLD STRIP.AUTO-MCNC: 229 MG/DL (ref 65–117)
SERVICE CMNT-IMP: ABNORMAL

## 2023-01-25 PROCEDURE — 74011250637 HC RX REV CODE- 250/637: Performed by: INTERNAL MEDICINE

## 2023-01-25 PROCEDURE — 65270000029 HC RM PRIVATE

## 2023-01-25 PROCEDURE — 74011250636 HC RX REV CODE- 250/636: Performed by: STUDENT IN AN ORGANIZED HEALTH CARE EDUCATION/TRAINING PROGRAM

## 2023-01-25 PROCEDURE — G0378 HOSPITAL OBSERVATION PER HR: HCPCS

## 2023-01-25 PROCEDURE — 74011000250 HC RX REV CODE- 250: Performed by: STUDENT IN AN ORGANIZED HEALTH CARE EDUCATION/TRAINING PROGRAM

## 2023-01-25 PROCEDURE — 74011250637 HC RX REV CODE- 250/637: Performed by: STUDENT IN AN ORGANIZED HEALTH CARE EDUCATION/TRAINING PROGRAM

## 2023-01-25 PROCEDURE — 96372 THER/PROPH/DIAG INJ SC/IM: CPT

## 2023-01-25 PROCEDURE — 94760 N-INVAS EAR/PLS OXIMETRY 1: CPT

## 2023-01-25 PROCEDURE — 74011636637 HC RX REV CODE- 636/637: Performed by: STUDENT IN AN ORGANIZED HEALTH CARE EDUCATION/TRAINING PROGRAM

## 2023-01-25 PROCEDURE — 82962 GLUCOSE BLOOD TEST: CPT

## 2023-01-25 RX ADMIN — Medication 3 UNITS: at 12:35

## 2023-01-25 RX ADMIN — METOPROLOL TARTRATE 50 MG: 50 TABLET, FILM COATED ORAL at 09:50

## 2023-01-25 RX ADMIN — SODIUM CHLORIDE, PRESERVATIVE FREE 10 ML: 5 INJECTION INTRAVENOUS at 05:29

## 2023-01-25 RX ADMIN — OXYCODONE HYDROCHLORIDE 5 MG: 5 TABLET ORAL at 09:50

## 2023-01-25 RX ADMIN — GLIPIZIDE 5 MG: 5 TABLET ORAL at 06:31

## 2023-01-25 RX ADMIN — MONTELUKAST 10 MG: 10 TABLET, FILM COATED ORAL at 21:41

## 2023-01-25 RX ADMIN — ATORVASTATIN CALCIUM 80 MG: 40 TABLET, FILM COATED ORAL at 09:50

## 2023-01-25 RX ADMIN — DICLOFENAC SODIUM 4 G: 10 GEL TOPICAL at 21:42

## 2023-01-25 RX ADMIN — CASTOR OIL AND BALSAM, PERU: 788; 87 OINTMENT TOPICAL at 10:00

## 2023-01-25 RX ADMIN — PANTOPRAZOLE SODIUM 40 MG: 40 TABLET, DELAYED RELEASE ORAL at 06:31

## 2023-01-25 RX ADMIN — TIMOLOL MALEATE 1 DROP: 5 SOLUTION OPHTHALMIC at 10:00

## 2023-01-25 RX ADMIN — GLIPIZIDE 5 MG: 5 TABLET ORAL at 16:30

## 2023-01-25 RX ADMIN — ASPIRIN 81 MG: 81 TABLET, COATED ORAL at 09:50

## 2023-01-25 RX ADMIN — Medication 2 UNITS: at 09:50

## 2023-01-25 RX ADMIN — DICLOFENAC SODIUM 4 G: 10 GEL TOPICAL at 10:01

## 2023-01-25 RX ADMIN — ENOXAPARIN SODIUM 40 MG: 100 INJECTION SUBCUTANEOUS at 09:50

## 2023-01-25 RX ADMIN — SODIUM CHLORIDE, PRESERVATIVE FREE 10 ML: 5 INJECTION INTRAVENOUS at 13:27

## 2023-01-25 RX ADMIN — DICLOFENAC SODIUM 4 G: 10 GEL TOPICAL at 18:00

## 2023-01-25 RX ADMIN — METOPROLOL TARTRATE 50 MG: 50 TABLET, FILM COATED ORAL at 18:02

## 2023-01-25 RX ADMIN — SODIUM CHLORIDE, PRESERVATIVE FREE 10 ML: 5 INJECTION INTRAVENOUS at 22:00

## 2023-01-25 RX ADMIN — SERTRALINE 50 MG: 50 TABLET, FILM COATED ORAL at 09:50

## 2023-01-25 RX ADMIN — DICLOFENAC SODIUM 4 G: 10 GEL TOPICAL at 13:00

## 2023-01-25 RX ADMIN — LATANOPROST 1 DROP: 50 SOLUTION OPHTHALMIC at 21:43

## 2023-01-25 RX ADMIN — CASTOR OIL AND BALSAM, PERU: 788; 87 OINTMENT TOPICAL at 18:00

## 2023-01-25 NOTE — PROGRESS NOTES
Transition of Care Plan:     RUR:  10%  Disposition: SNF - [address] [facility]   If SNF or IPR: Date FOC offered: 1/19/2023  Date [address] received: 1/19/2023  Date authorization started with reference number: 1/24/2023  Date authorization received and expires: 1/25/2023  Accepting facility: [facility] (no bed available today)  Follow up appointments: TBD  DME needed: TBD  Transportation at Discharge: medical transport   [address] or means to access home:      per family   IM Medicare Letter: will need 2nd IM prior to discharge  Is patient a [place] and connected with the [employer] [address]? N/A  If yes, was [place] transfer form completed and VA notified? Caregiver Contact: Dipak - Brant Juarez - [phone] or Arianna Boucher - [phone]  Discharge Caregiver contacted prior to discharge? yes  Care Conference needed?: Not at present time    Chart reviewed. CM continuing to follow for discharge planning. Pt remains medically stable for discharge. Pt has been approved on 1/25/23 1142. She was approved for SNF level 2. Service dates are 1/25/23-1/31/23 and updates are due on 1/31/23. Updates to be sent to [facility] and her fax number is [phone]. [facility] can be reached at [phone]. The auth number is [id number]. Informed [facility] of approval details. Per liaison, no bed available for pt to admit today. Facility is working on bed management and will let CM know by end of day today if they can offer pt a bed tomorrow. CM left VM for daughter, Rasheeda Wong. Awaiting response. Will continue to follow.     GILBERTO Romero   Care Manager, [facility]

## 2023-01-25 NOTE — PROGRESS NOTES
Hospitalist Progress Note    NAME: Wai Torres   :  1940   MRN:  [id number]       Assessment / Plan:  Left knee osteoarthritis exacerbated by a mechanical fall  Orthopedics consulted, greatly appreciate their expertise  -May benefit from intra-articular steroid injection if deemed appropriate  -No erythema to suggest septic joint or crystalline arthropathy  CT with severe tricompartmental OA  Trial Tylenol, topical Voltaren, oxycodone for pain control  PT OT recs SNF. Discussed at length with daughter at bedside that adult  would be beneficial to patient. It had been stopped with the pandemic. Patient agrees to return. UTI  Dirty UA, no reflex to culture, s/p 3-day course ceftriaxone     Elevated creatinine not meeting criteria for MAGDA-likely secondary to dehydration  Resolved     MDD/ALAYNA/insomnia  Continue PTA sertraline  Change PTA trazodone from twice daily to nightly-unclear why it was dose twice daily, but is likely contributing to excessive daytime somnolence  -Family and patient deny history of psychiatric disorder outside of depression     GERD  Protonix daily     Non-insulin-dependent diabetes mellitus  Resume home metformin     Essential hypertension  Continue PTA aspirin, atorvastatin, metoprolol      30.0 - 39.9 Obese / Body mass index is 39.79 kg/m². Estimated discharge date:   Barriers: placement    Code status: Full  Prophylaxis: Lovenox  Recommended Disposition: SNF/LTC     Subjective:     Chief Complaint / Reason for Physician Visit  Follow-up mechanical fall   No Acute complaints   discussed with RN events overnight.      Review of Systems:  Symptom Y/N Comments  Symptom Y/N Comments   Fever/Chills    Chest Pain     Poor Appetite    Edema     Cough    Abdominal Pain     Sputum    Joint Pain     SOB/DANIEL    Pruritis/Rash     Nausea/vomit    Tolerating PT/OT     Diarrhea    Tolerating Diet     Constipation    Other       Could NOT obtain due to:      Objective: VITALS:   Last 24hrs VS reviewed since prior progress note. Most recent are:  Patient Vitals for the past 24 hrs:   Temp Pulse Resp BP SpO2   01/25/23 0824 98.2 °F (36.8 °C) 69 18 (!) 175/73 97 %   01/25/23 0807 98.2 °F (36.8 °C) 76 16 (!) 165/75 97 %   01/24/23 2200 98.1 °F (36.7 °C) 68 17 (!) 147/65 93 %   01/24/23 1838 -- -- -- (!) 179/66 --       Intake/Output Summary (Last 24 hours) at 1/25/2023 1648  Last data filed at 1/25/2023 3409  Gross per 24 hour   Intake 390 ml   Output 2000 ml   Net -1610 ml        I had a face to face encounter and independently examined this patient on 1/25/2023, as outlined below:  PHYSICAL EXAM:  General: WD, WN. Alert, cooperative, no acute distress    EENT:  EOMI. Anicteric sclerae. MMM  Resp:  CTA bilaterally, no wheezing or rales. No accessory muscle use  CV:  Regular  rhythm,  No edema  GI:  Soft, Non distended, Non tender. +Bowel sounds  Neurologic:  Alert and oriented X 3, normal speech,   Psych:   Fair  insight. Not anxious nor agitated  Skin:  No rashes. No jaundice    Reviewed most current lab test results and cultures  YES  Reviewed most current radiology test results   YES  Review and summation of old records today    NO  Reviewed patient's current orders and MAR    YES  PMH/ reviewed - no change compared to H&P  ________________________________________________________________________  Care Plan discussed with:    Comments   Patient x    Family      RN x    Care Manager     Consultant                        Multidiciplinary team rounds were held today with , nursing, pharmacist and clinical coordinator. Patient's plan of care was discussed; medications were reviewed and discharge planning was addressed.      ________________________________________________________________________  Total NON critical care TIME:  35   Minutes    Total CRITICAL CARE TIME Spent:   Minutes non procedure based      Comments   >50% of visit spent in counseling and coordination of care     ________________________________________________________________________  Taniya Scales MD     Procedures: see electronic medical records for all procedures/Xrays and details which were not copied into this note but were reviewed prior to creation of Plan. LABS:  I reviewed today's most current labs and imaging studies. Pertinent labs include:  No results for input(s): WBC, HGB, HCT, PLT, HGBEXT, HCTEXT, PLTEXT in the last 72 hours. No results for input(s): NA, K, CL, CO2, GLU, BUN, CREA, CA, MG, PHOS, ALB, TBIL, TBILI, ALT, INR, INREXT in the last 72 hours.     No lab exists for component: SGOT    Signed: Taniya Scales MD

## 2023-01-25 NOTE — PROGRESS NOTES
Bedside shift change report given to Manuel Ruiz (oncoming nurse) by Mary Sender RN (offgoing nurse).  Report included the following information SBAR, Kardex, and MAR, intake and output

## 2023-01-25 NOTE — PROGRESS NOTES
Problem: Patient Education: Go to Patient Education Activity  Goal: Patient/Family Education  Outcome: Progressing Towards Goal     Problem: Patient Education: Go to Patient Education Activity  Goal: Patient/Family Education  Outcome: Progressing Towards Goal     Problem: Pressure Injury - Risk of  Goal: *Prevention of pressure injury  Description: Document Mathieu Scale and appropriate interventions in the flowsheet. Outcome: Progressing Towards Goal  Note: Pressure Injury Interventions:  Sensory Interventions: Assess changes in LOC    Moisture Interventions: Absorbent underpads    Activity Interventions: Increase time out of bed    Mobility Interventions: Float heels    Nutrition Interventions: Document food/fluid/supplement intake    Friction and Shear Interventions: HOB 30 degrees or less                Problem: Patient Education: Go to Patient Education Activity  Goal: Patient/Family Education  Outcome: Progressing Towards Goal     Problem: Falls - Risk of  Goal: *Absence of Falls  Description: Document Mane Fall Risk and appropriate interventions in the flowsheet.   Outcome: Progressing Towards Goal  Note: Fall Risk Interventions:            Medication Interventions: Bed/chair exit alarm    Elimination Interventions: Call light in reach    History of Falls Interventions: Bed/chair exit alarm         Problem: Patient Education: Go to Patient Education Activity  Goal: Patient/Family Education  Outcome: Progressing Towards Goal

## 2023-01-25 NOTE — PROGRESS NOTES
Bedside and Verbal shift change report given to Vandana Reyes RN (oncoming nurse) by Lainey Foy (offgoing nurse). Report included the following information SBAR, Kardex, Intake/Output, and MAR.

## 2023-01-25 NOTE — PROGRESS NOTES
Pharmacist Discharge Medication Reconciliation      Significant PMH: No past medical history on file. Encounter Diagnoses:   Encounter Diagnoses   Name Primary? Fall, initial encounter Yes    Acute pain of left knee      Allergies: Latex, Ace inhibitors, Iodine, and Tramadol    Admission date: 1/18/2023     Discharge Medications:   Current Discharge Medication List        START taking these medications    Details   diclofenac (VOLTAREN) 1 % gel Apply 4 g to affected area four (4) times daily. Qty: 20 g, Refills: 0  Start date: 1/24/2023      oxyCODONE IR (ROXICODONE) 5 mg immediate release tablet Take 1 Tablet by mouth every four (4) hours as needed for Pain for up to 3 days. Max Daily Amount: 30 mg.  Qty: 12 Tablet, Refills: 0  Start date: 1/24/2023, End date: 1/27/2023    Associated Diagnoses: Acute pain of left knee           CONTINUE these medications which have CHANGED    Details   traZODone (DESYREL) 50 mg tablet Take 1 Tablet by mouth nightly as needed for Sleep for up to 30 days. Qty: 30 Tablet, Refills: 0  Start date: 1/24/2023, End date: 2/23/2023           CONTINUE these medications which have NOT CHANGED    Details   latanoprost (XALATAN) 0.005 % ophthalmic solution Administer 1 Drop to both eyes nightly. timolol (TIMOPTIC) 0.5 % ophthalmic solution Administer 1 Drop to both eyes daily. aspirin delayed-release 81 mg tablet Take 81 mg by mouth daily. sertraline (ZOLOFT) 50 mg tablet Take 50 mg by mouth daily. montelukast (SINGULAIR) 10 mg tablet Take 10 mg by mouth nightly.      magnesium oxide (MAG-OX) 400 mg tablet Take 400 mg by mouth daily. atorvastatin (LIPITOR) 80 mg tablet Take 80 mg by mouth daily. esomeprazole (NEXIUM) 40 mg capsule Take 40 mg by mouth daily. losartan (COZAAR) 100 mg tablet Take 100 mg by mouth daily. metoprolol tartrate (LOPRESSOR) 50 mg tablet Take 50 mg by mouth two (2) times a day.       metFORMIN (GLUCOPHAGE) 500 mg tablet Take 500 mg by mouth two (2) times daily (with meals). glipiZIDE (GLUCOTROL) 10 mg tablet Take 10 mg by mouth two (2) times a day. ergocalciferol (ERGOCALCIFEROL) 1,250 mcg (50,000 unit) capsule Take 50,000 Units by mouth every Sunday. STOP taking these medications       meloxicam (MOBIC) 15 mg tablet Comments:   Reason for Stopping:               The patient's chart, MAR and AVS were reviewed by Rashaad Resendiz, [address] [place] [address].     Discharging Provider: Jose Le MD     Thank you,     Rashaad Resendiz, [address] [place] [address]

## 2023-01-26 VITALS
TEMPERATURE: 98.1 F | WEIGHT: 197 LBS | HEART RATE: 71 BPM | SYSTOLIC BLOOD PRESSURE: 177 MMHG | HEIGHT: 59 IN | DIASTOLIC BLOOD PRESSURE: 85 MMHG | BODY MASS INDEX: 39.72 KG/M2 | RESPIRATION RATE: 18 BRPM | OXYGEN SATURATION: 99 %

## 2023-01-26 LAB
GLUCOSE BLD STRIP.AUTO-MCNC: 168 MG/DL (ref 65–117)
GLUCOSE BLD STRIP.AUTO-MCNC: 172 MG/DL (ref 65–117)
SERVICE CMNT-IMP: ABNORMAL
SERVICE CMNT-IMP: ABNORMAL

## 2023-01-26 PROCEDURE — 96372 THER/PROPH/DIAG INJ SC/IM: CPT

## 2023-01-26 PROCEDURE — 74011636637 HC RX REV CODE- 636/637: Performed by: STUDENT IN AN ORGANIZED HEALTH CARE EDUCATION/TRAINING PROGRAM

## 2023-01-26 PROCEDURE — 74011000250 HC RX REV CODE- 250: Performed by: STUDENT IN AN ORGANIZED HEALTH CARE EDUCATION/TRAINING PROGRAM

## 2023-01-26 PROCEDURE — 94760 N-INVAS EAR/PLS OXIMETRY 1: CPT

## 2023-01-26 PROCEDURE — 74011250636 HC RX REV CODE- 250/636: Performed by: STUDENT IN AN ORGANIZED HEALTH CARE EDUCATION/TRAINING PROGRAM

## 2023-01-26 PROCEDURE — 74011250637 HC RX REV CODE- 250/637: Performed by: STUDENT IN AN ORGANIZED HEALTH CARE EDUCATION/TRAINING PROGRAM

## 2023-01-26 PROCEDURE — G0378 HOSPITAL OBSERVATION PER HR: HCPCS

## 2023-01-26 PROCEDURE — 82962 GLUCOSE BLOOD TEST: CPT

## 2023-01-26 PROCEDURE — 74011250637 HC RX REV CODE- 250/637: Performed by: INTERNAL MEDICINE

## 2023-01-26 RX ADMIN — ASPIRIN 81 MG: 81 TABLET, COATED ORAL at 09:10

## 2023-01-26 RX ADMIN — METOPROLOL TARTRATE 50 MG: 50 TABLET, FILM COATED ORAL at 09:10

## 2023-01-26 RX ADMIN — PANTOPRAZOLE SODIUM 40 MG: 40 TABLET, DELAYED RELEASE ORAL at 06:30

## 2023-01-26 RX ADMIN — Medication 2 UNITS: at 09:10

## 2023-01-26 RX ADMIN — DICLOFENAC SODIUM 4 G: 10 GEL TOPICAL at 09:09

## 2023-01-26 RX ADMIN — TIMOLOL MALEATE 1 DROP: 5 SOLUTION OPHTHALMIC at 09:09

## 2023-01-26 RX ADMIN — SODIUM CHLORIDE, PRESERVATIVE FREE 10 ML: 5 INJECTION INTRAVENOUS at 06:30

## 2023-01-26 RX ADMIN — CASTOR OIL AND BALSAM, PERU: 788; 87 OINTMENT TOPICAL at 09:09

## 2023-01-26 RX ADMIN — ENOXAPARIN SODIUM 40 MG: 100 INJECTION SUBCUTANEOUS at 09:10

## 2023-01-26 RX ADMIN — ATORVASTATIN CALCIUM 80 MG: 40 TABLET, FILM COATED ORAL at 09:10

## 2023-01-26 RX ADMIN — GLIPIZIDE 5 MG: 5 TABLET ORAL at 06:30

## 2023-01-26 RX ADMIN — SERTRALINE 50 MG: 50 TABLET, FILM COATED ORAL at 09:10

## 2023-01-26 NOTE — PROGRESS NOTES
Problem: Patient Education: Go to Patient Education Activity  Goal: Patient/Family Education  Outcome: Progressing Towards Goal     Problem: Patient Education: Go to Patient Education Activity  Goal: Patient/Family Education  Outcome: Progressing Towards Goal     Problem: Pressure Injury - Risk of  Goal: *Prevention of pressure injury  Description: Document Mathieu Scale and appropriate interventions in the flowsheet. Outcome: Progressing Towards Goal  Note: Pressure Injury Interventions:  Sensory Interventions: Assess changes in LOC    Moisture Interventions: Absorbent underpads    Activity Interventions: PT/OT evaluation    Mobility Interventions: PT/OT evaluation    Nutrition Interventions: Document food/fluid/supplement intake, Discuss nutritional consult with provider, Offer support with meals,snacks and hydration    Friction and Shear Interventions: Apply protective barrier, creams and emollients                Problem: Patient Education: Go to Patient Education Activity  Goal: Patient/Family Education  Outcome: Progressing Towards Goal     Problem: Falls - Risk of  Goal: *Absence of Falls  Description: Document Mane Fall Risk and appropriate interventions in the flowsheet.   Outcome: Progressing Towards Goal  Note: Fall Risk Interventions:            Medication Interventions: Bed/chair exit alarm    Elimination Interventions: Call light in reach    History of Falls Interventions: Bed/chair exit alarm         Problem: Patient Education: Go to Patient Education Activity  Goal: Patient/Family Education  Outcome: Progressing Towards Goal

## 2023-01-26 NOTE — PROGRESS NOTES
Bedside shift change report given to Manuel Ruiz  (oncoming nurse) by Petar Garcia RN (offgoing nurse). Report included the following information SBAR, Kardex, MAR, and Recent Results.

## 2023-01-26 NOTE — PROGRESS NOTES
Report given to receiving nurse at [facility] and [address] [place] [address]. No complaints at this time.

## 2023-01-26 NOTE — PROGRESS NOTES
Transition of Care Plan to SNF/Rehab    Communication to Patient/Family:  Met with patient and family and they are agreeable to the transition plan. The Plan for Transition of Care is related to the following treatment goals: SNF/rehab placement     The Patient and/or patient representative was provided with a choice of provider and agrees  with the discharge plan. Yes [x] No []    A Freedom of choice list was provided with basic dialogue that supports the patient's individualized plan of care/goals and shares the quality data associated with the providers. Yes [x] No []    SNF/Rehab Transition:  Patient has been accepted to [facility] and meets criteria for admission. Patient will transported by Hospital to Home and expected to leave at [address] [place] [address].    Communication to SNF/Rehab:  Bedside RN, Lisbeth Moore, has been notified to update the transition plan to the facility and call report ([phone]). Discharge information has been updated on the AVS. And communicated to facility via CC link. Discharge instructions to be fax'd to facility at (f) [phone]    Nursing Please include all hard scripts for controlled substances, med rec and dc summary, and AVS in packet. Reviewed and confirmed with facility, Macie Deleon can manage the patient care needs for the following:     [place] with (X) only those applicable:  Medication:  [x]Medications are available at the facility  []IV Antibiotics    [x]Controlled Substance - hard copies available sent.   []Weekly Labs    Equipment:  []CPAP/BiPAP  []Wound Vacuum  [x]Ramirez or Urinary Device (external)  []PICC/Central Line  []Nebulizer  []Ventilator    Treatment:  []Isolation (for MRSA, VRE, etc.)  []Surgical Drain Management  []Tracheostomy Care  [x]Dressing Changes  []Dialysis with transportation  []PEG Care  []Oxygen  []Daily Weights for Heart Failure    Dietary:  [x]Any diet limitations dysphagia   []Tube Feedings   []Total Parenteral Management (TPN) Financial Resources:  []Medicaid Application Completed    []UAI Completed and copy given to pt/family  and copy given to pt/family  [x]A screening has previously been completed. Medicaid in place POA     []Level II Completed    [] Private pay individual who will not become   financially eligible for Medicaid within 6 months from admission to a [address] [place] [address] facility. [] Individual refused to have screening conducted. []Medicaid Application Completed    []The screening denied because it was determined individual did not need/did not qualify for nursing facility level of care. [] Out of state residents seeking direct admission to a [address] facility. [] Individuals who are inpatients of an out of state hospital, or in state or out of state veterans/ hospital and seek direct admission to a [address] facility  [] Individuals who are pateints or residents of a state owned/operated facility that is licensed by Department of [employer] ([employer]BS) and seek direct admission to [address] [place] [address]  [] A screening not required for enrollment in [facility] services as set out in [address] [place] [address] 17-  [] [facility]) staff shall perform screenings of the [facility] clients. Advanced Care Plan:  []Surrogate Decision Maker of Care  [x]POA  [x]Communicated Code Status and copy sent.     Other:          GILBERTO Mix   Care Manager, [address] [facility]  [phone]

## 2023-01-26 NOTE — PROGRESS NOTES
Transition of Care Plan:     RUR:  10%  Disposition: SNF - [address] [facility]   If SNF or IPR: Date FOC offered: 1/19/2023  Date [address] received: 1/19/2023  Date authorization started with reference number: 1/24/2023  Date authorization received and expires: 1/25/2023  Accepting facility: [place], [place] # [id number]  Follow up appointments: TBD after rehab   DME needed: TBD  Transportation at Discharge: Hospital to Home, ETA 1PM via [address] or means to access home:      per family   IM Medicare Letter: reviewed on 1/26/23  Is patient a  and connected with the [employer]? N/A  If yes, was [employer] transfer form completed and VA notified? Caregiver Contact: Dipak Mora - [phone] or Felix Hernandez - [phone]  Discharge Caregiver contacted prior to discharge? yes  Care Conference needed?: No    Chart reviewed. CM aware of discharge order. Confirmed that [facility] has a SNF bed available for pt to admit today as planned. Insurance authorization approved on 1/25/23. Pt assigned to room#317B. RN to call report to [phone]. CM has secured Hospital to Home transport via stretcher, ETA is 1PM today. CM met with pt at bedside to finalize and review d/c plan. 2nd IMM letter provided at bedside. Signed copy placed into chart. Please see scanned documents. [address] letter signed and placed into chart. CM spoke with both dipak Smith and Sobia Chica) by phone this morning as well. Please ensure all pt belongings go with her at time of d/c. (Clothes in closet)    No further CM needs identified at this time. Ready for d/c from CM standpoint. Care Management Interventions  PCP Verified by CM:  Yes  Palliative Care Criteria Met (RRAT>21 & CHF Dx)?: No  Mode of Transport at Discharge: [facility] Transport Time of Discharge: 1300  Transition of Care Consult (CM Consult): Discharge Planning  Discharge Durable Medical Equipment: No  Physical Therapy Consult: Yes  Occupational Therapy Consult: Yes  Speech Therapy Consult: No  Support Systems: Child(robin), Other Family Member(s)  Confirm Follow Up Transport: Family  The Plan for Transition of Care is Related to the Following Treatment Goals : SNF  The Patient and/or Patient Representative was Provided with a Choice of Provider and Agrees with the Discharge Plan?: Yes  Name of the Patient Representative Who was Provided with a Choice of Provider and Agrees with the Discharge Plan: daughters  Freedom of Choice List was Provided with Basic Dialogue that Supports the Patient's Individualized Plan of Care/Goals, Treatment Preferences and Shares the Quality Data Associated with the Providers?: Yes  Discharge Location  Patient Expects to be Discharged to[de-identified] Skilled nursing facility ([facility] )    Edna Oglesby, [address], [place] [facility]  [phone]

## 2023-01-26 NOTE — PROGRESS NOTES
Problem: Patient Education: Go to Patient Education Activity  Goal: Patient/Family Education  Outcome: Resolved/Met     Problem: Patient Education: Go to Patient Education Activity  Goal: Patient/Family Education  Outcome: Resolved/Met     Problem: Pressure Injury - Risk of  Goal: *Prevention of pressure injury  Description: Document Mathieu Scale and appropriate interventions in the flowsheet. Outcome: Resolved/Met     Problem: Patient Education: Go to Patient Education Activity  Goal: Patient/Family Education  Outcome: Resolved/Met     Problem: Falls - Risk of  Goal: *Absence of Falls  Description: Document Mane Fall Risk and appropriate interventions in the flowsheet.   Outcome: Resolved/Met     Problem: Patient Education: Go to Patient Education Activity  Goal: Patient/Family Education  Outcome: Resolved/Met

## 2023-05-21 RX ORDER — LOSARTAN POTASSIUM 100 MG/1
100 TABLET ORAL DAILY
COMMUNITY

## 2023-05-21 RX ORDER — MAGNESIUM OXIDE 400 MG/1
400 TABLET ORAL DAILY
COMMUNITY

## 2023-05-21 RX ORDER — ERGOCALCIFEROL 1.25 MG/1
50000 CAPSULE ORAL
COMMUNITY

## 2023-05-21 RX ORDER — METOPROLOL TARTRATE 50 MG/1
50 TABLET, FILM COATED ORAL 2 TIMES DAILY
COMMUNITY

## 2023-05-21 RX ORDER — ATORVASTATIN CALCIUM 80 MG/1
80 TABLET, FILM COATED ORAL DAILY
COMMUNITY

## 2023-05-21 RX ORDER — MONTELUKAST SODIUM 10 MG/1
10 TABLET ORAL NIGHTLY
COMMUNITY

## 2023-05-21 RX ORDER — GLIPIZIDE 10 MG/1
10 TABLET ORAL 2 TIMES DAILY
COMMUNITY

## 2023-05-21 RX ORDER — LATANOPROST 50 UG/ML
1 SOLUTION/ DROPS OPHTHALMIC
COMMUNITY

## 2023-05-21 RX ORDER — ASPIRIN 81 MG/1
81 TABLET ORAL DAILY
COMMUNITY

## 2023-05-21 RX ORDER — TIMOLOL MALEATE 5 MG/ML
1 SOLUTION/ DROPS OPHTHALMIC DAILY
COMMUNITY

## 2023-05-21 RX ORDER — ESOMEPRAZOLE MAGNESIUM 40 MG/1
40 CAPSULE, DELAYED RELEASE ORAL DAILY
COMMUNITY

## 2024-03-06 ENCOUNTER — OFFICE VISIT (OUTPATIENT)
Facility: CLINIC | Age: 84
End: 2024-03-06
Payer: MEDICARE

## 2024-03-06 DIAGNOSIS — I10 PRIMARY HYPERTENSION: Primary | ICD-10-CM

## 2024-03-06 PROCEDURE — 1123F ACP DISCUSS/DSCN MKR DOCD: CPT | Performed by: INTERNAL MEDICINE

## 2024-03-06 PROCEDURE — 99309 SBSQ NF CARE MODERATE MDM 30: CPT | Performed by: INTERNAL MEDICINE

## 2024-03-07 NOTE — PROGRESS NOTES
PLACE OF SERVICE:  [facility] [address] [place] [address]       SKILLED VISIT    3/6/2024    Chief Complaint:    60-day visit      HPI : Yaritza Miller is a 83 y.o. female history of hypertension diabetes GERD osteoarthritis long-term resident of this facility.  Patient is seen today for a 60-day visit.  Patient is wheelchair bedbound she is able to feed herself denies any chest pain shortness of breath.  Patient had a intra-articular injection to the left knee by outside provider yesterday for advanced osteoarthritis.  She thinks it is helping    PMH:   Hypertension  Diabetes type 2  Osteoarthritis  GERD  Anxiety      Medications: Reviewed in EMR and assessment and plan    Social History / Family History:           Family History   Problem Relation Age of Onset    Breast Cancer Sister         ROS: Cardiovascular negative pulmonary negative      Vitals:   156/76 pulse 68 respiration 18 temperature 97.5        Exam:   General looks stated age sitting in the chair in no acute distress  HEENT PERRL EOMI  Neck no nodes no masses trachea midline no JVD  Lungs clear to auscultation bilaterally no wheezes no rhonchi no accessory muscle use  Heart rate rhythm regular S1 and S2 no murmurs  Abdomen soft nontender no guarding no rigidity bowel sounds are active  Extremities no clubbing cyanosis or edema she has osteoarthritis of the knees  Neuro she is awake alert oriented no motor sensory deficits  Skin dry intact      Labs:     Assessment/Plans:     Hypertension  Blood pressure trend reviewed  Continue losartan 100 mg daily  Metoprolol 50 mg daily  Increase clonidine point 0.1 mg twice daily    Diabetes type 2  Continue metformin 5 mg twice a day  Check A1c    Osteoarthritis of the knees  Continue Percocet and meloxicam    GERD on PPI    Depression  Continue Zoloft 50 mg daily    Insomnia continue trazodone 50 mg daily        Familia Hurtado MD

## 2024-09-19 ENCOUNTER — OFFICE VISIT (OUTPATIENT)
Facility: CLINIC | Age: 84
End: 2024-09-19

## 2024-09-19 VITALS
OXYGEN SATURATION: 93 % | HEIGHT: 59 IN | BODY MASS INDEX: 39.68 KG/M2 | SYSTOLIC BLOOD PRESSURE: 126 MMHG | DIASTOLIC BLOOD PRESSURE: 74 MMHG | WEIGHT: 196.8 LBS | RESPIRATION RATE: 18 BRPM | TEMPERATURE: 97.8 F | HEART RATE: 60 BPM

## 2024-09-19 DIAGNOSIS — Z00.00 MEDICARE ANNUAL WELLNESS VISIT, SUBSEQUENT: Primary | ICD-10-CM

## 2024-09-19 DIAGNOSIS — J30.89 NON-SEASONAL ALLERGIC RHINITIS DUE TO OTHER ALLERGIC TRIGGER: ICD-10-CM

## 2024-09-19 DIAGNOSIS — K21.00 GASTROESOPHAGEAL REFLUX DISEASE WITH ESOPHAGITIS WITHOUT HEMORRHAGE: ICD-10-CM

## 2024-09-19 DIAGNOSIS — E11.42 TYPE 2 DIABETES MELLITUS WITH DIABETIC POLYNEUROPATHY, WITHOUT LONG-TERM CURRENT USE OF INSULIN (HCC): ICD-10-CM

## 2024-09-19 PROBLEM — I10 ESSENTIAL HYPERTENSION: Status: ACTIVE | Noted: 2024-09-19

## 2024-09-19 PROBLEM — E11.9 TYPE 2 DIABETES MELLITUS WITHOUT COMPLICATION, WITHOUT LONG-TERM CURRENT USE OF INSULIN (HCC): Status: ACTIVE | Noted: 2024-09-19

## 2024-09-19 PROBLEM — E78.2 MIXED HYPERLIPIDEMIA: Status: ACTIVE | Noted: 2024-09-19

## 2024-09-19 PROBLEM — J30.9 ALLERGIC RHINITIS DUE TO ALLERGEN: Status: ACTIVE | Noted: 2024-09-19

## 2024-09-19 RX ORDER — ONDANSETRON 4 MG/1
4 TABLET, FILM COATED ORAL EVERY 8 HOURS PRN
COMMUNITY

## 2024-09-19 RX ORDER — METOPROLOL SUCCINATE 50 MG/1
TABLET, EXTENDED RELEASE ORAL
COMMUNITY
Start: 2024-08-27

## 2024-09-19 RX ORDER — CLONIDINE HYDROCHLORIDE 0.1 MG/1
0.1 TABLET ORAL 2 TIMES DAILY
COMMUNITY

## 2024-09-19 RX ORDER — OXYCODONE HYDROCHLORIDE 5 MG/1
TABLET ORAL
COMMUNITY
Start: 2024-09-03 | End: 2024-09-19

## 2024-09-19 RX ORDER — TRAZODONE HYDROCHLORIDE 50 MG/1
50 TABLET, FILM COATED ORAL NIGHTLY
COMMUNITY

## 2024-09-19 RX ORDER — ACETAMINOPHEN 325 MG/1
650 TABLET ORAL EVERY 6 HOURS PRN
COMMUNITY

## 2024-09-19 RX ORDER — OXYCODONE AND ACETAMINOPHEN 5; 325 MG/1; MG/1
1 TABLET ORAL EVERY 4 HOURS PRN
COMMUNITY
End: 2024-09-19

## 2024-09-19 RX ORDER — ESOMEPRAZOLE MAGNESIUM 40 MG
40 CAPSULE,DELAYED RELEASE (ENTERIC COATED) ORAL
Qty: 90 CAPSULE | Refills: 3 | Status: SHIPPED | OUTPATIENT
Start: 2024-09-19

## 2024-09-19 RX ORDER — CETIRIZINE HYDROCHLORIDE 10 MG/1
10 TABLET ORAL DAILY
Qty: 90 TABLET | Refills: 3 | Status: SHIPPED | OUTPATIENT
Start: 2024-09-19

## 2024-09-19 RX ORDER — FLUTICASONE PROPIONATE 50 MCG
1 SPRAY, SUSPENSION (ML) NASAL DAILY
Qty: 16 G | Refills: 3 | Status: SHIPPED | OUTPATIENT
Start: 2024-09-19 | End: 2024-09-19

## 2024-09-19 RX ORDER — LOPERAMIDE HYDROCHLORIDE 2 MG/1
2 TABLET ORAL 4 TIMES DAILY PRN
COMMUNITY

## 2024-09-19 RX ORDER — BRIMONIDINE TARTRATE 2 MG/ML
SOLUTION/ DROPS OPHTHALMIC
COMMUNITY
Start: 2024-09-06

## 2024-09-19 RX ORDER — ESOMEPRAZOLE MAGNESIUM 40 MG
40 CAPSULE,DELAYED RELEASE (ENTERIC COATED) ORAL
Qty: 90 CAPSULE | Refills: 3 | Status: SHIPPED | OUTPATIENT
Start: 2024-09-19 | End: 2024-09-19

## 2024-09-19 RX ORDER — ALBUTEROL SULFATE 0.83 MG/ML
SOLUTION RESPIRATORY (INHALATION)
COMMUNITY
Start: 2024-09-17

## 2024-09-19 RX ORDER — CETIRIZINE HYDROCHLORIDE 10 MG/1
10 TABLET ORAL DAILY
Qty: 90 TABLET | Refills: 3 | Status: SHIPPED | OUTPATIENT
Start: 2024-09-19 | End: 2024-09-19

## 2024-09-19 RX ORDER — AMLODIPINE BESYLATE 10 MG/1
TABLET ORAL
COMMUNITY
Start: 2024-09-04 | End: 2024-09-19

## 2024-09-19 RX ORDER — FLUTICASONE PROPIONATE 50 MCG
1 SPRAY, SUSPENSION (ML) NASAL DAILY
Qty: 16 G | Refills: 3 | Status: SHIPPED | OUTPATIENT
Start: 2024-09-19

## 2024-09-19 SDOH — ECONOMIC STABILITY: FOOD INSECURITY: WITHIN THE PAST 12 MONTHS, THE FOOD YOU BOUGHT JUST DIDN'T LAST AND YOU DIDN'T HAVE MONEY TO GET MORE.: NEVER TRUE

## 2024-09-19 SDOH — ECONOMIC STABILITY: FOOD INSECURITY: WITHIN THE PAST 12 MONTHS, YOU WORRIED THAT YOUR FOOD WOULD RUN OUT BEFORE YOU GOT MONEY TO BUY MORE.: NEVER TRUE

## 2024-09-19 SDOH — ECONOMIC STABILITY: INCOME INSECURITY: HOW HARD IS IT FOR YOU TO PAY FOR THE VERY BASICS LIKE FOOD, HOUSING, MEDICAL CARE, AND HEATING?: NOT HARD AT ALL

## 2024-09-19 ASSESSMENT — PATIENT HEALTH QUESTIONNAIRE - PHQ9
1. LITTLE INTEREST OR PLEASURE IN DOING THINGS: NEARLY EVERY DAY
5. POOR APPETITE OR OVEREATING: NEARLY EVERY DAY
10. IF YOU CHECKED OFF ANY PROBLEMS, HOW DIFFICULT HAVE THESE PROBLEMS MADE IT FOR YOU TO DO YOUR WORK, TAKE CARE OF THINGS AT HOME, OR GET ALONG WITH OTHER PEOPLE: VERY DIFFICULT
SUM OF ALL RESPONSES TO PHQ QUESTIONS 1-9: 17
4. FEELING TIRED OR HAVING LITTLE ENERGY: NEARLY EVERY DAY
2. FEELING DOWN, DEPRESSED OR HOPELESS: NEARLY EVERY DAY
7. TROUBLE CONCENTRATING ON THINGS, SUCH AS READING THE NEWSPAPER OR WATCHING TELEVISION: SEVERAL DAYS
SUM OF ALL RESPONSES TO PHQ QUESTIONS 1-9: 17
9. THOUGHTS THAT YOU WOULD BE BETTER OFF DEAD, OR OF HURTING YOURSELF: NOT AT ALL
8. MOVING OR SPEAKING SO SLOWLY THAT OTHER PEOPLE COULD HAVE NOTICED. OR THE OPPOSITE, BEING SO FIGETY OR RESTLESS THAT YOU HAVE BEEN MOVING AROUND A LOT MORE THAN USUAL: NOT AT ALL
6. FEELING BAD ABOUT YOURSELF - OR THAT YOU ARE A FAILURE OR HAVE LET YOURSELF OR YOUR FAMILY DOWN: SEVERAL DAYS
3. TROUBLE FALLING OR STAYING ASLEEP: NEARLY EVERY DAY
SUM OF ALL RESPONSES TO PHQ9 QUESTIONS 1 & 2: 6

## 2024-09-19 ASSESSMENT — LIFESTYLE VARIABLES
HOW OFTEN DO YOU HAVE A DRINK CONTAINING ALCOHOL: NEVER
HOW MANY STANDARD DRINKS CONTAINING ALCOHOL DO YOU HAVE ON A TYPICAL DAY: PATIENT DOES NOT DRINK

## 2024-09-20 LAB
BUN SERPL-MCNC: 31 MG/DL (ref 8–27)
BUN/CREAT SERPL: 22 (ref 12–28)
CALCIUM SERPL-MCNC: 8.9 MG/DL (ref 8.7–10.3)
CHLORIDE SERPL-SCNC: 102 MMOL/L (ref 96–106)
CO2 SERPL-SCNC: 21 MMOL/L (ref 20–29)
CREAT SERPL-MCNC: 1.44 MG/DL (ref 0.57–1)
EGFRCR SERPLBLD CKD-EPI 2021: 36 ML/MIN/1.73
GLUCOSE SERPL-MCNC: 283 MG/DL (ref 70–99)
HBA1C MFR BLD: 8.2 % (ref 4.8–5.6)
POTASSIUM SERPL-SCNC: 5.4 MMOL/L (ref 3.5–5.2)
SODIUM SERPL-SCNC: 138 MMOL/L (ref 134–144)

## 2025-01-16 ENCOUNTER — OFFICE VISIT (OUTPATIENT)
Facility: CLINIC | Age: 85
End: 2025-01-16
Payer: MEDICARE

## 2025-01-16 VITALS
SYSTOLIC BLOOD PRESSURE: 145 MMHG | WEIGHT: 189.7 LBS | HEIGHT: 59 IN | OXYGEN SATURATION: 95 % | RESPIRATION RATE: 18 BRPM | TEMPERATURE: 98.3 F | BODY MASS INDEX: 38.24 KG/M2 | HEART RATE: 62 BPM | DIASTOLIC BLOOD PRESSURE: 76 MMHG

## 2025-01-16 DIAGNOSIS — I10 ESSENTIAL HYPERTENSION: ICD-10-CM

## 2025-01-16 DIAGNOSIS — Z00.00 MEDICARE ANNUAL WELLNESS VISIT, SUBSEQUENT: Primary | ICD-10-CM

## 2025-01-16 DIAGNOSIS — E11.42 TYPE 2 DIABETES MELLITUS WITH DIABETIC POLYNEUROPATHY, WITHOUT LONG-TERM CURRENT USE OF INSULIN (HCC): ICD-10-CM

## 2025-01-16 PROCEDURE — 1159F MED LIST DOCD IN RCRD: CPT | Performed by: FAMILY MEDICINE

## 2025-01-16 PROCEDURE — 3078F DIAST BP <80 MM HG: CPT | Performed by: FAMILY MEDICINE

## 2025-01-16 PROCEDURE — G0439 PPPS, SUBSEQ VISIT: HCPCS | Performed by: FAMILY MEDICINE

## 2025-01-16 PROCEDURE — 1160F RVW MEDS BY RX/DR IN RCRD: CPT | Performed by: FAMILY MEDICINE

## 2025-01-16 PROCEDURE — 1123F ACP DISCUSS/DSCN MKR DOCD: CPT | Performed by: FAMILY MEDICINE

## 2025-01-16 PROCEDURE — 3046F HEMOGLOBIN A1C LEVEL >9.0%: CPT | Performed by: FAMILY MEDICINE

## 2025-01-16 PROCEDURE — 3077F SYST BP >= 140 MM HG: CPT | Performed by: FAMILY MEDICINE

## 2025-01-16 RX ORDER — ESOMEPRAZOLE MAGNESIUM 40 MG/1
40 CAPSULE, DELAYED RELEASE ORAL
Qty: 90 CAPSULE | Refills: 3 | Status: SHIPPED | OUTPATIENT
Start: 2025-01-16

## 2025-01-16 RX ORDER — MAGNESIUM OXIDE 400 MG/1
400 TABLET ORAL DAILY
Qty: 90 TABLET | Refills: 3 | Status: SHIPPED | OUTPATIENT
Start: 2025-01-16

## 2025-01-16 SDOH — ECONOMIC STABILITY: FOOD INSECURITY: WITHIN THE PAST 12 MONTHS, YOU WORRIED THAT YOUR FOOD WOULD RUN OUT BEFORE YOU GOT MONEY TO BUY MORE.: NEVER TRUE

## 2025-01-16 SDOH — ECONOMIC STABILITY: FOOD INSECURITY: WITHIN THE PAST 12 MONTHS, THE FOOD YOU BOUGHT JUST DIDN'T LAST AND YOU DIDN'T HAVE MONEY TO GET MORE.: NEVER TRUE

## 2025-01-16 ASSESSMENT — PATIENT HEALTH QUESTIONNAIRE - PHQ9
2. FEELING DOWN, DEPRESSED OR HOPELESS: SEVERAL DAYS
4. FEELING TIRED OR HAVING LITTLE ENERGY: NOT AT ALL
3. TROUBLE FALLING OR STAYING ASLEEP: NOT AT ALL
SUM OF ALL RESPONSES TO PHQ QUESTIONS 1-9: 2
8. MOVING OR SPEAKING SO SLOWLY THAT OTHER PEOPLE COULD HAVE NOTICED. OR THE OPPOSITE, BEING SO FIGETY OR RESTLESS THAT YOU HAVE BEEN MOVING AROUND A LOT MORE THAN USUAL: NOT AT ALL
5. POOR APPETITE OR OVEREATING: NOT AT ALL
SUM OF ALL RESPONSES TO PHQ9 QUESTIONS 1 & 2: 2
1. LITTLE INTEREST OR PLEASURE IN DOING THINGS: SEVERAL DAYS
10. IF YOU CHECKED OFF ANY PROBLEMS, HOW DIFFICULT HAVE THESE PROBLEMS MADE IT FOR YOU TO DO YOUR WORK, TAKE CARE OF THINGS AT HOME, OR GET ALONG WITH OTHER PEOPLE: NOT DIFFICULT AT ALL
9. THOUGHTS THAT YOU WOULD BE BETTER OFF DEAD, OR OF HURTING YOURSELF: NOT AT ALL
7. TROUBLE CONCENTRATING ON THINGS, SUCH AS READING THE NEWSPAPER OR WATCHING TELEVISION: NOT AT ALL
6. FEELING BAD ABOUT YOURSELF - OR THAT YOU ARE A FAILURE OR HAVE LET YOURSELF OR YOUR FAMILY DOWN: NOT AT ALL
SUM OF ALL RESPONSES TO PHQ QUESTIONS 1-9: 2

## 2025-01-16 ASSESSMENT — LIFESTYLE VARIABLES
HOW MANY STANDARD DRINKS CONTAINING ALCOHOL DO YOU HAVE ON A TYPICAL DAY: PATIENT DOES NOT DRINK
HOW OFTEN DO YOU HAVE A DRINK CONTAINING ALCOHOL: NEVER

## 2025-01-16 NOTE — PROGRESS NOTES
Medicare Annual Wellness Visit    Yaritza Miller is here for Medicare AWV    Assessment & Plan   Medicare annual wellness visit, subsequent  Type 2 diabetes mellitus with diabetic polyneuropathy, without long-term current use of insulin (HCC)  -     Hemoglobin A1C; Future  -     Basic Metabolic Panel; Future  Essential hypertension  -     Basic Metabolic Panel; Future     Return in about 3 months (around 4/16/2025) for follow up chronic conditions.     Subjective   The following acute and/or chronic problems were also addressed today:  Diabetes-patient and daughter expressed frustration with limited ability to control diet, elevated blood sugars at nursing home.    Hypertension-states a lot of food has excessive salt in nursing home, think this may be triggering some elevated blood pressure.  Overall doing well today.    Patient's complete Health Risk Assessment and screening values have been reviewed and are found in Flowsheets. The following problems were reviewed today and where indicated follow up appointments were made and/or referrals ordered.    Positive Risk Factor Screenings with Interventions:    Fall Risk:  Do you feel unsteady or are you worried about falling? : (!) yes  2 or more falls in past year?: no  Fall with injury in past year?: no     Interventions:    Reviewed medications, home hazards, visual acuity, and co-morbidities that can increase risk for falls  See AVS for additional education material    Cognitive:   Clock Drawing Test (CDT): (!) Abnormal  Words recalled: 1 Word Recalled  Total Score: (!) 1  Total Score Interpretation: Abnormal Mini-Cog             Poor Eating Habits/Diet:  Do you eat balanced/healthy meals regularly?: (!) No  Interventions:  See A/P for plan and any pertinent orders    Abnormal BMI (obese):  Body mass index is 38.31 kg/m². (!) Abnormal  Interventions:  See A/P for plan and any pertinent orders                 Objective   Vitals:    01/16/25 1616   BP: (!) 145/76

## 2025-01-16 NOTE — PROGRESS NOTES
Chief Complaint   Patient presents with    Medicare AWV     \"Have you been to the ER, urgent care clinic since your last visit?  Hospitalized since your last visit?\"    NO    “Have you seen or consulted any other health care providers outside our system since your last visit?”    NO

## 2025-01-17 LAB
BUN SERPL-MCNC: 27 MG/DL (ref 8–27)
BUN/CREAT SERPL: 20 (ref 12–28)
CALCIUM SERPL-MCNC: 9.5 MG/DL (ref 8.7–10.3)
CHLORIDE SERPL-SCNC: 100 MMOL/L (ref 96–106)
CO2 SERPL-SCNC: 17 MMOL/L (ref 20–29)
CREAT SERPL-MCNC: 1.34 MG/DL (ref 0.57–1)
EGFRCR SERPLBLD CKD-EPI 2021: 39 ML/MIN/1.73
GLUCOSE SERPL-MCNC: 195 MG/DL (ref 70–99)
HBA1C MFR BLD: 9.4 % (ref 4.8–5.6)
POTASSIUM SERPL-SCNC: 4.7 MMOL/L (ref 3.5–5.2)
SODIUM SERPL-SCNC: 142 MMOL/L (ref 134–144)

## 2025-04-23 ENCOUNTER — OFFICE VISIT (OUTPATIENT)
Facility: CLINIC | Age: 85
End: 2025-04-23
Payer: MEDICARE

## 2025-04-23 VITALS
HEIGHT: 59 IN | WEIGHT: 186.2 LBS | DIASTOLIC BLOOD PRESSURE: 74 MMHG | SYSTOLIC BLOOD PRESSURE: 112 MMHG | HEART RATE: 66 BPM | BODY MASS INDEX: 37.54 KG/M2 | TEMPERATURE: 97.4 F | OXYGEN SATURATION: 93 % | RESPIRATION RATE: 16 BRPM

## 2025-04-23 DIAGNOSIS — I10 ESSENTIAL HYPERTENSION: ICD-10-CM

## 2025-04-23 DIAGNOSIS — E11.42 TYPE 2 DIABETES MELLITUS WITH DIABETIC POLYNEUROPATHY, WITHOUT LONG-TERM CURRENT USE OF INSULIN (HCC): Primary | ICD-10-CM

## 2025-04-23 DIAGNOSIS — R19.7 INTERMITTENT DIARRHEA: ICD-10-CM

## 2025-04-23 PROCEDURE — 1123F ACP DISCUSS/DSCN MKR DOCD: CPT | Performed by: FAMILY MEDICINE

## 2025-04-23 PROCEDURE — 3078F DIAST BP <80 MM HG: CPT | Performed by: FAMILY MEDICINE

## 2025-04-23 PROCEDURE — 99214 OFFICE O/P EST MOD 30 MIN: CPT | Performed by: FAMILY MEDICINE

## 2025-04-23 PROCEDURE — 1159F MED LIST DOCD IN RCRD: CPT | Performed by: FAMILY MEDICINE

## 2025-04-23 PROCEDURE — 3046F HEMOGLOBIN A1C LEVEL >9.0%: CPT | Performed by: FAMILY MEDICINE

## 2025-04-23 PROCEDURE — 3074F SYST BP LT 130 MM HG: CPT | Performed by: FAMILY MEDICINE

## 2025-04-23 PROCEDURE — 1126F AMNT PAIN NOTED NONE PRSNT: CPT | Performed by: FAMILY MEDICINE

## 2025-04-23 RX ORDER — HYDROCHLOROTHIAZIDE 12.5 MG/1
1 CAPSULE ORAL
Qty: 3 EACH | Refills: 11 | Status: SHIPPED | OUTPATIENT
Start: 2025-04-23

## 2025-04-23 RX ORDER — FUROSEMIDE 20 MG/1
TABLET ORAL
COMMUNITY
Start: 2025-04-07 | End: 2025-04-23

## 2025-04-23 RX ORDER — DORZOLAMIDE HYDROCHLORIDE AND TIMOLOL MALEATE 20; 5 MG/ML; MG/ML
SOLUTION/ DROPS OPHTHALMIC
COMMUNITY
Start: 2025-04-21

## 2025-04-23 RX ORDER — OMEPRAZOLE 20 MG/1
CAPSULE, DELAYED RELEASE ORAL
COMMUNITY
Start: 2025-04-09

## 2025-04-23 RX ORDER — LOPERAMIDE HYDROCHLORIDE 2 MG/1
2 TABLET ORAL 4 TIMES DAILY PRN
Qty: 60 TABLET | Refills: 3 | Status: SHIPPED | OUTPATIENT
Start: 2025-04-23

## 2025-04-23 RX ORDER — OXYCODONE HYDROCHLORIDE 5 MG/1
TABLET ORAL
COMMUNITY
Start: 2025-02-13

## 2025-04-23 RX ORDER — MONTELUKAST SODIUM 10 MG/1
TABLET ORAL
COMMUNITY
Start: 2025-04-16

## 2025-04-23 NOTE — PROGRESS NOTES
Chief Complaint   Patient presents with    3 Month Follow-Up     Pt is here for a 3 month follow up      \"Have you been to the ER, urgent care clinic since your last visit?  Hospitalized since your last visit?\"    NO    “Have you seen or consulted any other health care providers outside our system since your last visit?”    NO

## 2025-04-23 NOTE — PROGRESS NOTES
Yaritza Miller (:  1940) is a 84 y.o. female, Established patient, here for evaluation of the following chief complaint(s):  3 Month Follow-Up (Pt is here for a 3 month follow up )      Subjective   History of Present Illness  The patient presents for evaluation of diabetes, diarrhea, and medication management. She is accompanied by her daughter.    Blood glucose levels have been progressively increasing, which is a cause for concern. A consultation with a dietitian has been requested due to dietary restrictions, including an allergy to pineapples, which have been included in her meals. Sugar-containing beverages have been consumed, and adherence to a diabetic diet has not been maintained. Meals often include bread products, rice, and potatoes, sometimes twice daily, without portion control. Concerns have been expressed about the accuracy of the blood pressure cuffs and blood sugar meters used at the facility. Fiber-rich foods and shakes, such as Ensure Max Protein with fiber and 1 g of sugar, have been encouraged, but she has been resistant due to fear of diarrhea. Prescribed medications have been received more consistently. Currently, she is on Lipitor 40 mg and probiotics, which are taken a few times a week.    Diarrhea has been experienced, attributed to her current situation. Imodium has been prescribed for diarrhea, but it has not been administered. Recently, an episode of diarrhea occurred four times in one day, for which only one over-the-counter pill was given instead of the recommended two.    A medication for potassium was prescribed but has been discontinued. A retest of potassium levels has been requested. Bananas have been consumed daily.    A rollator is being used instead of a wheelchair, and a knee injection has been received. Physical therapy has been resumed, leading to increased mobility, although activity has decreased in the past week and a half due to fatigue.    Review of

## 2025-04-24 LAB
ALBUMIN SERPL-MCNC: 3.9 G/DL (ref 3.7–4.7)
ALBUMIN/CREAT UR: 62 MG/G CREAT (ref 0–29)
ALP SERPL-CCNC: 108 IU/L (ref 44–121)
ALT SERPL-CCNC: 8 IU/L (ref 0–32)
AST SERPL-CCNC: 12 IU/L (ref 0–40)
BILIRUB SERPL-MCNC: 0.2 MG/DL (ref 0–1.2)
BUN SERPL-MCNC: 31 MG/DL (ref 8–27)
BUN/CREAT SERPL: 23 (ref 12–28)
CALCIUM SERPL-MCNC: 9 MG/DL (ref 8.7–10.3)
CHLORIDE SERPL-SCNC: 102 MMOL/L (ref 96–106)
CO2 SERPL-SCNC: 19 MMOL/L (ref 20–29)
CREAT SERPL-MCNC: 1.32 MG/DL (ref 0.57–1)
CREAT UR-MCNC: 197.7 MG/DL
EGFRCR SERPLBLD CKD-EPI 2021: 40 ML/MIN/1.73
GLOBULIN SER CALC-MCNC: 3.3 G/DL (ref 1.5–4.5)
GLUCOSE SERPL-MCNC: 181 MG/DL (ref 70–99)
HBA1C MFR BLD: 9.3 % (ref 4.8–5.6)
MICROALBUMIN UR-MCNC: 122.2 UG/ML
POTASSIUM SERPL-SCNC: 5.3 MMOL/L (ref 3.5–5.2)
PROT SERPL-MCNC: 7.2 G/DL (ref 6–8.5)
SODIUM SERPL-SCNC: 138 MMOL/L (ref 134–144)

## 2025-04-25 ENCOUNTER — TELEPHONE (OUTPATIENT)
Facility: CLINIC | Age: 85
End: 2025-04-25

## 2025-04-25 NOTE — TELEPHONE ENCOUNTER
patient's daughter was called about pt's labs (Yaritza Angela): A1c has remained elevated, and her other labs have stayed stable.  Ongoing stress on her kidneys, likely from the elevated sugar, but has not significantly changed. No changes to meds at this time, but continue to work on improving diet and check sugars more regularly

## 2025-07-23 ENCOUNTER — OFFICE VISIT (OUTPATIENT)
Facility: CLINIC | Age: 85
End: 2025-07-23
Payer: MEDICARE

## 2025-07-23 VITALS
RESPIRATION RATE: 16 BRPM | BODY MASS INDEX: 38.34 KG/M2 | HEART RATE: 62 BPM | HEIGHT: 59 IN | OXYGEN SATURATION: 93 % | SYSTOLIC BLOOD PRESSURE: 123 MMHG | DIASTOLIC BLOOD PRESSURE: 72 MMHG | WEIGHT: 190.2 LBS | TEMPERATURE: 97.4 F

## 2025-07-23 DIAGNOSIS — M21.619 BUNION: ICD-10-CM

## 2025-07-23 DIAGNOSIS — N18.32 TYPE 2 DIABETES MELLITUS WITH STAGE 3B CHRONIC KIDNEY DISEASE, WITHOUT LONG-TERM CURRENT USE OF INSULIN (HCC): ICD-10-CM

## 2025-07-23 DIAGNOSIS — E78.2 MIXED HYPERLIPIDEMIA: ICD-10-CM

## 2025-07-23 DIAGNOSIS — K21.00 GASTROESOPHAGEAL REFLUX DISEASE WITH ESOPHAGITIS WITHOUT HEMORRHAGE: ICD-10-CM

## 2025-07-23 DIAGNOSIS — I10 ESSENTIAL HYPERTENSION: ICD-10-CM

## 2025-07-23 DIAGNOSIS — N18.32 STAGE 3B CHRONIC KIDNEY DISEASE (HCC): ICD-10-CM

## 2025-07-23 DIAGNOSIS — E11.42 TYPE 2 DIABETES MELLITUS WITH DIABETIC POLYNEUROPATHY, WITHOUT LONG-TERM CURRENT USE OF INSULIN (HCC): Primary | ICD-10-CM

## 2025-07-23 DIAGNOSIS — E11.22 TYPE 2 DIABETES MELLITUS WITH STAGE 3B CHRONIC KIDNEY DISEASE, WITHOUT LONG-TERM CURRENT USE OF INSULIN (HCC): ICD-10-CM

## 2025-07-23 PROCEDURE — 3046F HEMOGLOBIN A1C LEVEL >9.0%: CPT | Performed by: FAMILY MEDICINE

## 2025-07-23 PROCEDURE — 1123F ACP DISCUSS/DSCN MKR DOCD: CPT | Performed by: FAMILY MEDICINE

## 2025-07-23 PROCEDURE — 3074F SYST BP LT 130 MM HG: CPT | Performed by: FAMILY MEDICINE

## 2025-07-23 PROCEDURE — 1125F AMNT PAIN NOTED PAIN PRSNT: CPT | Performed by: FAMILY MEDICINE

## 2025-07-23 PROCEDURE — 1159F MED LIST DOCD IN RCRD: CPT | Performed by: FAMILY MEDICINE

## 2025-07-23 PROCEDURE — 3078F DIAST BP <80 MM HG: CPT | Performed by: FAMILY MEDICINE

## 2025-07-23 PROCEDURE — 99214 OFFICE O/P EST MOD 30 MIN: CPT | Performed by: FAMILY MEDICINE

## 2025-07-23 RX ORDER — OXYCODONE AND ACETAMINOPHEN 5; 325 MG/1; MG/1
TABLET ORAL
COMMUNITY
Start: 2025-07-08

## 2025-07-23 RX ORDER — ESOMEPRAZOLE MAGNESIUM 20 MG
20 CAPSULE,DELAYED RELEASE (ENTERIC COATED) ORAL DAILY
Qty: 30 CAPSULE | Refills: 11 | Status: SHIPPED | OUTPATIENT
Start: 2025-07-23

## 2025-07-23 RX ORDER — ASPIRIN 81 MG/1
81 TABLET, CHEWABLE ORAL DAILY
COMMUNITY

## 2025-07-23 RX ORDER — FUROSEMIDE 20 MG/1
TABLET ORAL
COMMUNITY
Start: 2025-07-01

## 2025-07-23 RX ORDER — ESOMEPRAZOLE MAGNESIUM 20 MG
20 CAPSULE,DELAYED RELEASE (ENTERIC COATED) ORAL DAILY
Qty: 30 CAPSULE | Refills: 3 | Status: SHIPPED | OUTPATIENT
Start: 2025-07-23 | End: 2025-07-23

## 2025-07-23 RX ORDER — HYDRALAZINE HYDROCHLORIDE 25 MG/1
TABLET, FILM COATED ORAL
COMMUNITY
Start: 2025-07-04

## 2025-07-23 NOTE — PROGRESS NOTES
Yaritza Miller (:  1940) is a 84 y.o. female, Established patient, here for evaluation of the following chief complaint(s):  3 Month Follow-Up and Diabetes      Subjective   History of Present Illness  The patient presents for evaluation of diabetes, acid reflux, and bunion.    She continues to experience elevated A1c levels. She has been on metformin 500 mg, taking 2 tablets twice daily, but it has been causing stomach upset. She is interested in trying Januvia as an alternative. She has not consulted any other healthcare providers since her last visit here. She reports frequent diarrhea, necessitating bathroom visits up to 6 times during the night. She ensures adequate hydration by drinking extra water.    She is seeking a letter to support her request for Nexium, as previous medications have been ineffective. She was previously on Nexium, which proved beneficial. However, she was switched to generic esomeprazole and then Prilosec, which did not yield the same results. She has also tried Protonix in the past. She underwent endoscopy and colonoscopy procedures, both of which were normal.    She was scheduled for a bunion removal procedure on her left foot in 2024, but it was postponed due to her age and diabetic status. The plan was to shave down the bunion and straighten the toe slightly, making it an outpatient procedure.    She has been taking Lasix without experiencing any swelling in her ankles or feet. She spends most of her time off her feet.    Social History:  Sleep: She reports frequent diarrhea, necessitating bathroom visits up to 6 times during the night.    PAST SURGICAL HISTORY:  Endoscopy  Colonoscopy    FAMILY HISTORY  - Several relatives have issues with metformin causing stomach upset    Review of Systems  As per HPI       Objective   Blood pressure 123/72, pulse 62, temperature 97.4 °F (36.3 °C), temperature source Oral, resp. rate 16, height 1.499 m (4' 11\"), weight 86.3 kg (190 lb

## 2025-07-23 NOTE — PROGRESS NOTES
Chief Complaint   Patient presents with    3 Month Follow-Up    Diabetes     Have you been to the ER, urgent care clinic since your last visit?  Hospitalized since your last visit?   NO    Have you seen or consulted any other health care providers outside our system since your last visit?   NO

## 2025-07-25 LAB
BUN SERPL-MCNC: 29 MG/DL (ref 8–27)
BUN/CREAT SERPL: 20 (ref 12–28)
CALCIUM SERPL-MCNC: 9.1 MG/DL (ref 8.7–10.3)
CHLORIDE SERPL-SCNC: 101 MMOL/L (ref 96–106)
CHOLEST SERPL-MCNC: 149 MG/DL (ref 100–199)
CO2 SERPL-SCNC: 20 MMOL/L (ref 20–29)
CREAT SERPL-MCNC: 1.42 MG/DL (ref 0.57–1)
EGFRCR SERPLBLD CKD-EPI 2021: 36 ML/MIN/1.73
EST. AVERAGE GLUCOSE BLD GHB EST-MCNC: 189 MG/DL
GLUCOSE SERPL-MCNC: 201 MG/DL (ref 70–99)
HBA1C MFR BLD: 8.2 % (ref 4.8–5.6)
HDLC SERPL-MCNC: 45 MG/DL
LDLC SERPL CALC-MCNC: 79 MG/DL (ref 0–99)
POTASSIUM SERPL-SCNC: 5 MMOL/L (ref 3.5–5.2)
SODIUM SERPL-SCNC: 137 MMOL/L (ref 134–144)
TRIGL SERPL-MCNC: 144 MG/DL (ref 0–149)
VLDLC SERPL CALC-MCNC: 25 MG/DL (ref 5–40)

## 2025-07-28 ENCOUNTER — TELEPHONE (OUTPATIENT)
Facility: CLINIC | Age: 85
End: 2025-07-28

## 2025-07-28 NOTE — TELEPHONE ENCOUNTER
Please notify patient or daughter that labs show stable kidney function, and I still recommend establishing with a kidney doctor.  A1c has continued to improve, but we will recheck in a few months off of the metformin and on the Januvia.  Otherwise labs are good.